# Patient Record
Sex: FEMALE | Race: WHITE | NOT HISPANIC OR LATINO | Employment: OTHER | URBAN - METROPOLITAN AREA
[De-identification: names, ages, dates, MRNs, and addresses within clinical notes are randomized per-mention and may not be internally consistent; named-entity substitution may affect disease eponyms.]

---

## 2017-10-04 ENCOUNTER — ALLSCRIPTS OFFICE VISIT (OUTPATIENT)
Dept: OTHER | Facility: OTHER | Age: 57
End: 2017-10-04

## 2017-10-05 NOTE — PROGRESS NOTES
Assessment  1  History of Hand Incision Tendon Sheath Of A Finger   2  Piriformis syndrome of right side (355 0) (G57 01)   3  Lumbago (724 2) (M54 5)    Plan  Lumbago, Piriformis syndrome of right side    · Cyclobenzaprine HCl - 5 MG Oral Tablet; TAKE 1 TABLET AT BEDTIME AS NEEDED   · PredniSONE 20 MG Oral Tablet; TAKE 3 TABLETS DAILY WITH FOOD    Discussion/Summary  Discussion Summary:   Patient is a 60-year-old femalePiriformis syndrome/lumbago - patient's symptoms appear likely secondary to moderate to severe lumbar spasm/sciatica  She was educated on the pathophysiology of this problem  Hold off on checking x-ray imaging  Start Rice treatment  Elevate legs at nighttime  May apply a warm compress as well as her TENS unit  Start treatment with 60 mg daily for the next 5 days  She is also given a prescription for Flexeril to take only at nighttime  If any of her symptoms are worsening, she was given ER precautions  Follow-up in 3 weeks if persisting  Chief Complaint  Chief Complaint Free Text Note Form: Pt present with back spasms since thrusday unsure how it happen, hard to walk, and stand  Dep screening is negative  History of Present Illness  Back Pain Lumbar, Acute (Brief): The patient is being seen for an initial evaluation of this episode of acute low back pain  Symptoms: not intermittent and unrelenting   The patient presents with complaints of sudden onset of constant episodes of bilateral lower back back pain, described as aching and throbbing, non-radiating  On a scale of 1 to 10, the patient rates the pain as 5  Episodes started 6 days ago  Symptoms are not improved by ice, heat, recumbency and NSAIDs  Symptoms are made worse by standing, sitting, prolonged sitting, lifting and bending  Associated symptoms: no insomnia,-no malaise,-no leg weakness,-no leg numbness,-no saddle paresthesia,-no fecal incontinence,-no urinary incontinence,-no urinary frequency,-no hematuria-and-no dysuria  Review of Systems  Complete-Female:   Constitutional: not feeling poorly-and-not feeling tired  Eyes: no eyesight problems-and-no purulent discharge from the eyes  ENT: no sore throat-and-no nasal discharge  Cardiovascular: no chest pain-and-no palpitations  Respiratory: no cough-and-no shortness of breath during exertion  Gastrointestinal: no nausea-and-no diarrhea  Genitourinary: no dysuria-and-no pelvic pain  Musculoskeletal: arthralgias, but-no myalgias  Integumentary: no rashes-and-no skin lesions  Neurological: difficulty walking, but-no numbness,-no dizziness-and-no limb weakness  Psychiatric: no anxiety-and-no depression  Endocrine: no muscle weakness-and-no deepening of the voice  Hematologic/Lymphatic: no tendency for easy bleeding-and-no tendency for easy bruising  Past Medical History  Active Problems And Past Medical History Reviewed: The active problems and past medical history were reviewed and updated today  Surgical History  1  History of Hand Incision Tendon Sheath Of A Finger  Surgical History Reviewed: The surgical history was reviewed and updated today  Family History  Mother    1  Family history of diabetes mellitus (V18 0) (Z83 3)   2  Family history of fibromyalgia (V17 89) (Z82 69)  Father    3  Family history of    4  Family history of cardiac disorder (V17 49) (Z82 49)   5  Family history of heart failure (V17 49) (Z82 49)  Brother    6  Family history of diabetes mellitus (V18 0) (Z83 3)  Family History Reviewed: The family history was reviewed and updated today  Social History   · Always uses seat belt   · Feels safe at home   · Former smoker (L20 76) (X34 433)   · Has 2 children   ·    · No guns in the home   · No illicit drug use   · Occasional alcohol use  Social History Reviewed: The social history was reviewed and updated today  The social history was reviewed and is unchanged  Allergies  1   Ibuprofen CAPS 2  Ibuprofen TABS   3  Naproxen Sodium TABS   4  Naproxen TABS  5  Seasonal    Vitals  Vital Signs    Recorded: 31XMD8497 10:03AM   Temperature 98 1 F, Tympanic   Heart Rate 73   Respiration 18   Systolic 025   Diastolic 88   Height 5 ft 4 in   Weight 234 lb 5 oz   BMI Calculated 40 22   BSA Calculated 2 09   O2 Saturation 97   Pain Scale 9     Physical Exam    Constitutional   General appearance: No acute distress, well appearing and well nourished  Eyes   Conjunctiva and lids: No swelling, erythema or discharge  Pupils and irises: Equal, round and reactive to light  Ears, Nose, Mouth, and Throat   External inspection of ears and nose: Normal     Otoscopic examination: Tympanic membranes translucent with normal light reflex  Canals patent without erythema  Nasal mucosa, septum, and turbinates: Normal without edema or erythema  Oropharynx: Normal with no erythema, edema, exudate or lesions  Pulmonary   Respiratory effort: No increased work of breathing or signs of respiratory distress  Auscultation of lungs: Clear to auscultation  Cardiovascular   Auscultation of heart: Normal rate and rhythm, normal S1 and S2, without murmurs  Examination of extremities for edema and/or varicosities: Normal     Abdomen   Abdomen: Non-tender, no masses  Liver and spleen: No hepatomegaly or splenomegaly  Lymphatic   Palpation of lymph nodes in neck: No lymphadenopathy  Musculoskeletal   Gait and station: Normal     Inspection/palpation of joints, bones, and muscles: Normal     Skin   Skin and subcutaneous tissue: Normal without rashes or lesions  Neurologic   Cranial nerves: Cranial nerves 2-12 intact  Sensation: No sensory loss      Psychiatric   Orientation to person, place, and time: Normal     Mood and affect: Normal          Results/Data  PHQ-9 Adult Depression Screening 36YMZ8863 10:17AM User, Ahs     Test Name Result Flag Reference   PHQ-9 Adult Depression Score 0     Over the last two weeks, how often have you been bothered by any of the following problems? Little interest or pleasure in doing things: Not at all - 0  Feeling down, depressed, or hopeless: Not at all - 0  Trouble falling or staying asleep, or sleeping too much: Not at all - 0  Feeling tired or having little energy: Not at all - 0  Poor appetite or over eating: Not at all - 0  Feeling bad about yourself - or that you are a failure or have let yourself or your family down: Not at all - 0  Trouble concentrating on things, such as reading the newspaper or watching television: Not at all - 0  Moving or speaking so slowly that other people could have noticed  Or the opposite -  being so fidgety or restless that you have been moving around a lot more than usual: Not at all - 0  Thoughts that you would be better off dead, or of hurting yourself in some way: Not at all - 0   PHQ-9 Adult Depression Screening Negative     PHQ-9 Difficulty Level Not difficult at all     PHQ-9 Severity No Depression         Signatures   Electronically signed by :  Sawyer Saleh DO; Oct  4 2017 11:25AM EST                       (Author)

## 2017-11-10 ENCOUNTER — ALLSCRIPTS OFFICE VISIT (OUTPATIENT)
Dept: OTHER | Facility: OTHER | Age: 57
End: 2017-11-10

## 2017-11-10 DIAGNOSIS — Z12.31 ENCOUNTER FOR SCREENING MAMMOGRAM FOR MALIGNANT NEOPLASM OF BREAST: ICD-10-CM

## 2017-11-11 NOTE — PROGRESS NOTES
Assessment    1  Excessive cerumen in left ear canal (380 4) (H61 22)   2  Acute atopic conjunctivitis of both eyes (372 05) (H10 13)    Plan  Acute atopic conjunctivitis of both eyes    · Azelastine HCl - 0 05 % Ophthalmic Solution; INSTILL 1 DROP IN EACH EYETWICE DAILY AS NEEDED  Colon cancer screening    · COLONOSCOPY; Status:Active - Retrospective By Protocol Authorization; Requestedfor:10Nov2017;    · *1 -  [facility] Co-Management  *  Status: Active Requested for: 60ZAZ1209  Care Summary provided  : Yes  Encounter for screening mammogram for malignant neoplasm of breast    · * MAMMO SCREENING BILATERAL W CAD; Status:Hold For - Scheduling,RetrospectiveBy Protocol Authorization; Requested for:10Nov2017;   Excessive cerumen in left ear canal    · Removal Impacted Cerumen Using Irrigation / Lavage one or both ears - POC;Status:Complete;   Done: 40GDS1287 03:42PM  Flu vaccine need    · Stop: Fluzone Quadrivalent Intramuscular Suspension    Discussion/Summary    Discussed condition with patient  She reports subjective improvement after ear lavage of the left EAC  I recommend that she keep the ear canal dry and should her symptoms return, that is also possible that she can call went L&I has eustachian tube dysfunction which I then recommended that she try an over-the-counter steroid nasal spray and oral decongestant  Regarding her eye symptoms, I suspect she has intermittent atopic conjunctivitis which I provided her a prescription for Astelin eyedrops to use as needed  She is to follow up should symptoms continue to persist  I also gave her prescription for yearly screening mammogram    Possible side effects of new medications were reviewed with the patient/guardian today  The treatment plan was reviewed with the patient/guardian  The patient/guardian understands and agrees with the treatment plan      Chief Complaint  Pt c/o B/L ear fullness and decreased hearing x1 wk   Pt states that her L ear is completely 'plugged ' Pt also c/o B/L eyes watering  History of Present Illness  HPI: Pt  presents with 1 week history of B/L ear fullness, pressure, decreased hearing, blocked sensation  She denies any sinus/nasal congestion  She has also had some B/L intermittent eye watering that she does not have currently  She is not sure of it could be related to allergies  Review of Systems   Constitutional: No fever, no chills, feels well, no tiredness, no recent weight gain or loss  ENT: as noted in HPI  Cardiovascular: no complaints of slow or fast heart rate, no chest pain, no palpitations, no leg claudication or lower extremity edema  Respiratory: no complaints of shortness of breath, no wheezing, no dyspnea on exertion, no orthopnea or PND  Gastrointestinal: no complaints of abdominal pain, no constipation, no nausea or diarrhea, no vomiting, no bloody stools  Genitourinary: no complaints of dysuria, no incontinence, no pelvic pain, no dysmenorrhea, no vaginal discharge or abnormal vaginal bleeding  Other Symptoms: Intermittent eye watering-as noted in HPI  Active Problems  1  Lumbago (724 2) (M54 5)   2  Piriformis syndrome of right side (355 0) (G57 01)    Past Medical History  1  Denied: History of substance abuse   2  Denied: History of Mental health problem    Family History  Mother    1  Family history of diabetes mellitus (V18 0) (Z83 3)   2  Family history of fibromyalgia (V17 89) (Z82 69)  Father    3  Family history of    4  Family history of cardiac disorder (V17 49) (Z82 49)   5  Family history of heart failure (V17 49) (Z82 49)  Brother    6  Family history of diabetes mellitus (V18 0) (Z83 3)    Social History   · Always uses seat belt   · Feels safe at home   · Former smoker (Q83 35) (L83 067)   · Has 2 children   ·    · No guns in the home   · No illicit drug use   · Occasional alcohol use  The social history was reviewed and is unchanged  Surgical History  1  History of Hand Incision Tendon Sheath Of A Finger    Allergies  1  Ibuprofen CAPS   2  Ibuprofen TABS   3  Naproxen Sodium TABS   4  Naproxen TABS  5  Seasonal    Vitals   Recorded: 98IOI1465 03:07PM   Temperature 97 4 F, Tympanic   Heart Rate 97   Pulse Quality Normal   Respiration Quality Normal   Respiration 18   Systolic 377, RUE, Sitting   Diastolic 82, RUE, Sitting   BP CUFF SIZE Large   Height 5 ft 4 in   Weight 235 lb 1 oz   BMI Calculated 40 35   BSA Calculated 2 09   O2 Saturation 97   LMP postmenopausal       Physical Exam   Constitutional  General appearance: No acute distress, well appearing and well nourished  Eyes  Conjunctiva and lids: No swelling, erythema or discharge  Pupils and irises: Equal, round and reactive to light  Ears, Nose, Mouth, and Throat  External inspection of ears and nose: Normal    Otoscopic examination: Abnormal  -- Left EAC with cerumen that is abutting the TM so it is not clearly visible; right ear exam is unremarkable  Nasal mucosa, septum, and turbinates: Normal without edema or erythema  Oropharynx: Normal with no erythema, edema, exudate or lesions  Pulmonary  Respiratory effort: No increased work of breathing or signs of respiratory distress  Auscultation of lungs: Clear to auscultation  Cardiovascular  Auscultation of heart: Normal rate and rhythm, normal S1 and S2, without murmurs  Lymphatic  Palpation of lymph nodes in neck: No lymphadenopathy  Psychiatric  Orientation to person, place, and time: Normal    Mood and affect: Normal    Additional Exam:  Vital signs reviewed  Procedure           Procedure: cerumen removal   Indication: tympanic membrane(s) could not be visualized and cerumen impaction in the left ear  Prep: Debrox was placed in the canal prior to the procedure  Procedure Note: The procedure was performed by the Provider-- and-- lasted 10 minute(s)--   The procedure required significant time and effort to remove the cerumen    A otoscope was placed in the ear canal(s) to visualize the ear canal debris  The ear was cleaned by using warm water irrigation  The procedure was successful  Post-Procedure:  Patient Status: the patient tolerated the procedure well  Complications: there were no complications  Patient instructions: dry ear precautions-- and-- avoid using q-tips  Follow-up as needed  Signatures   Electronically signed by : oJseluis Sequeira, [facility]; Nov 10 2017  4:08PM EST                       (Author)    Electronically signed by :  Lindie Dancer, DO; Nov 10 2017  9:05PM EST                       (Author)

## 2018-01-12 VITALS
HEIGHT: 64 IN | BODY MASS INDEX: 40.13 KG/M2 | TEMPERATURE: 97.4 F | RESPIRATION RATE: 18 BRPM | WEIGHT: 235.06 LBS | DIASTOLIC BLOOD PRESSURE: 82 MMHG | SYSTOLIC BLOOD PRESSURE: 124 MMHG | HEART RATE: 97 BPM | OXYGEN SATURATION: 97 %

## 2018-01-13 VITALS
BODY MASS INDEX: 40 KG/M2 | SYSTOLIC BLOOD PRESSURE: 142 MMHG | RESPIRATION RATE: 18 BRPM | OXYGEN SATURATION: 97 % | HEIGHT: 64 IN | DIASTOLIC BLOOD PRESSURE: 88 MMHG | HEART RATE: 73 BPM | TEMPERATURE: 98.1 F | WEIGHT: 234.31 LBS

## 2018-01-15 ENCOUNTER — ALLSCRIPTS OFFICE VISIT (OUTPATIENT)
Dept: OTHER | Facility: OTHER | Age: 58
End: 2018-01-15

## 2018-01-15 DIAGNOSIS — K59.09 OTHER CONSTIPATION: ICD-10-CM

## 2018-01-16 NOTE — CONSULTS
Assessment   1  Colon cancer screening (V76 51) (Z12 11)   2  Chronic constipation (564 00) (K59 09)   3  Morbid obesity with BMI of 40 0-44 9, adult (278 01,V85 41) (E66 01,Z68 41)    Plan   Chronic constipation    · MoviPrep 100 GM Oral Solution Reconstituted; USE AS DIRECTED   Rx By: Louis Hammond; Dispense: 1 Days ; #:1 Solution Reconstituted; Refill: 0;For: Chronic constipation; OSEI = N; Verified Transmission to [address] [place] [address]; Last Updated By: SystemBringrs; 1/15/2018 9:01:49 AM   · Follow-up visit in 4 Months Evaluation and Treatment  [facility]  [facility]: [facility] For    - Scheduling  Requested for: [id number]   Ordered; For: Chronic constipation; Ordered By: Louis Hammond Performed:  Due: 51NBB4513   · (1) TSH; Status:Active; Requested GZC:[id number]; Perform:[facility]; LYW:[id number];ZLCSDBB; For:Chronic constipation; Ordered By:Natali Baker;  Colon cancer screening    · COLONOSCOPY (GI, SURG); Status:Hold For - Scheduling; Requested FPL:[id number]; Perform:[facility]; AQT:52RGL3053;ALYSSA; For:Colon cancer screening; Ordered By:Chaput, Karyle Malta; Morbid obesity with BMI of 40 0-44 9, adult    · 1 Armida Giles MD, Homero Hernandez (Internal Medicine) Co-Management  *  Status: Active  Requested    for: 65JJA8245   Ordered; For: Morbid obesity with BMI of 40 0-44 9, adult; Ordered By: Louis Hammond Performed:  Due: 76EWU0404  Care Summary provided  : Yes    Discussion/Summary   Discussion Summary:    63 year old female    colon cancer screening- she is agreeable to colonoscopy so we will arrange, risks and benefits discussed with pt   constipation- currently diet controlled, pt not interested in medication, will check TSH, recent CBC and CMP were wnl   obesity with BMI of 40- pt agreeable to medical weight loss, will refer  up in a few months time  Chief Complaint   Chief Complaint Free Text Note Form: pt consult for colon cancer screening   referred by Dr Luther Gaitan History of Present Illness   HPI: 65year old female here as an evaluation for colon cancer screening  reports long standing constipation  She has tried miralax in the past  has started a new diet and has lost weight recently but has put it back on     has never had a colonoscopy  Denies any family history of colon cancer  Review of Systems   Complete-Female GI Adult:      Constitutional: No fever, no chills, feels well, no tiredness, no recent weight gain or weight loss  Eyes: eyesight problems, but-- no eye pain,-- no dryness of the eyes,-- eyes not red,-- no purulent discharge from the eyes-- and-- no itching of the eyes  ENT: no complaints of earache, no loss of hearing, no nose bleeds, no nasal discharge, no sore throat, no hoarseness  Cardiovascular: No complaints of slow heart rate, no fast heart rate, no chest pain, no palpitations, no leg claudication, no lower extremity edema  Respiratory: No complaints of shortness of breath, no wheezing, no cough, no SOB on exertion, no orthopnea, no PND  Gastrointestinal: constipation, but-- no abdominal pain,-- no nausea,-- no vomiting,-- no diarrhea-- and-- no blood in stools  Genitourinary: No complaints of dysuria, no incontinence, no pelvic pain, no dysmenorrhea, no vaginal discharge or bleeding  Musculoskeletal: No complaints of arthralgias, no myalgias, no joint swelling or stiffness, no limb pain or swelling  Integumentary: No complaints of skin rash or lesions, no itching, no skin wounds, no breast pain or lump  Neurological: No complaints of headache, no confusion, no convulsions, no numbness, no dizziness or fainting, no tingling, no limb weakness, no difficulty walking  Psychiatric: Not suicidal, no sleep disturbance, no anxiety or depression, no change in personality, no emotional problems        Endocrine: No complaints of proptosis, no hot flashes, no muscle weakness, no deepening of the voice, no feelings of weakness  Hematologic/Lymphatic: No complaints of swollen glands, no swollen glands in the neck, does not bleed easily, does not bruise easily  ROS Reviewed:    ROS reviewed  Active Problems   1  Acute atopic conjunctivitis of both eyes (372 05) (H10 13)   2  Colon cancer screening (V76 51) (Z12 11)   3  Excessive cerumen in left ear canal (380 4) (H61 22)   4  Flu vaccine need (V04 81) (Z23)   5  Lumbago (724 2) (M54 5)   6  Piriformis syndrome of right side (355 0) (G57 01)    Past Medical History   1  Denied: History of substance abuse   2  Denied: History of Mental health problem  Active Problems And Past Medical History Reviewed: The active problems and past medical history were reviewed and updated today  Surgical History   1  History of Hand Incision Tendon Sheath Of A Finger  Surgical History Reviewed: The surgical history was reviewed and updated today  Family History   Mother    1  Family history of diabetes mellitus (V18 0) (Z83 3)   2  Family history of fibromyalgia (V17 89) (Z82 69)   3  Denied: Family history of substance abuse   4  Denied: Family history of Mental health problem  Father    5  Family history of    6  Family history of cardiac disorder (V17 49) (Z82 49)   7  Family history of heart failure (V17 49) (Z82 49)   8  Denied: Family history of substance abuse   9  Denied: Family history of Mental health problem  Brother    8  Family history of diabetes mellitus (V18 0) (Z83 3)  Family History Reviewed: The family history was reviewed and updated today  Social History    · Always uses seat belt   · Feels safe at home   · Former smoker (K78 33) (X85 477)   · Has 2 children   ·    · No guns in the home   · No illicit drug use   · Occasional alcohol use  Social History Reviewed: The social history was reviewed and updated today  Current Meds    1  No Reported Medications Recorded  Medication List Reviewed:     The medication list was reviewed and updated today  Allergies   1  Ibuprofen CAPS   2  Ibuprofen TABS   3  Naproxen Sodium TABS   4  Naproxen TABS  5  Seasonal    Vitals   Vital Signs    Recorded: 17OJP7733 08:46AM   Temperature 98 F, Tympanic   Heart Rate 76   Systolic 873, RUE, Sitting   Diastolic 80, RUE, Sitting   Height 5 ft 4 in   Weight 236 lb 2 0 oz   BMI Calculated 40 53   BSA Calculated 2 1   O2 Saturation 97, RA     Physical Exam        Constitutional      General appearance: No acute distress, well appearing and well nourished  Eyes      Conjunctiva and lids: No swelling, erythema or discharge  Pupils and irises: Equal, round and reactive to light  Ears, Nose, Mouth, and Throat      External inspection of ears and nose: Normal        Nasal mucosa, septum, and turbinates: Normal without edema or erythema  Oropharynx: Normal with no erythema, edema, exudate or lesions  Pulmonary      Respiratory effort: No increased work of breathing or signs of respiratory distress  Auscultation of lungs: Clear to auscultation  Cardiovascular      Auscultation of heart: Normal rate and rhythm, normal S1 and S2, without murmurs  Examination of extremities for edema and/or varicosities: Normal        Abdomen      Abdomen: Non-tender, no masses  Liver and spleen: No hepatomegaly or splenomegaly  Lymphatic      Palpation of lymph nodes in neck: No lymphadenopathy  Musculoskeletal      Gait and station: Normal        Digits and nails: Normal without clubbing or cyanosis  Inspection/palpation of joints, bones, and muscles: Normal        Skin      Skin and subcutaneous tissue: Normal without rashes or lesions         Psychiatric      Orientation to person, place, and time: Normal        Mood and affect: Normal           Signatures    Electronically signed by : Kavita Shaw DO; Lencho 15 2018  9:07AM EST                       (Author)

## 2018-01-22 VITALS
WEIGHT: 236.13 LBS | OXYGEN SATURATION: 97 % | TEMPERATURE: 98 F | DIASTOLIC BLOOD PRESSURE: 80 MMHG | BODY MASS INDEX: 40.31 KG/M2 | HEIGHT: 64 IN | HEART RATE: 76 BPM | SYSTOLIC BLOOD PRESSURE: 110 MMHG

## 2018-01-30 PROBLEM — Z12.11 SPECIAL SCREENING FOR MALIGNANT NEOPLASMS, COLON: Status: ACTIVE | Noted: 2018-01-30

## 2018-02-20 ENCOUNTER — OFFICE VISIT (OUTPATIENT)
Dept: FAMILY MEDICINE CLINIC | Facility: CLINIC | Age: 58
End: 2018-02-20
Payer: COMMERCIAL

## 2018-02-20 VITALS
TEMPERATURE: 97.4 F | HEART RATE: 70 BPM | HEIGHT: 63 IN | OXYGEN SATURATION: 99 % | WEIGHT: 235.9 LBS | BODY MASS INDEX: 41.8 KG/M2 | SYSTOLIC BLOOD PRESSURE: 126 MMHG | DIASTOLIC BLOOD PRESSURE: 82 MMHG

## 2018-02-20 DIAGNOSIS — M25.561 ACUTE PAIN OF RIGHT KNEE: Primary | ICD-10-CM

## 2018-02-20 PROCEDURE — 99213 OFFICE O/P EST LOW 20 MIN: CPT | Performed by: FAMILY MEDICINE

## 2018-02-20 RX ORDER — MELOXICAM 7.5 MG/1
7.5 TABLET ORAL
COMMUNITY
Start: 2017-10-10 | End: 2018-10-10

## 2018-02-20 RX ORDER — CYCLOBENZAPRINE HCL 5 MG
5 TABLET ORAL
Status: ON HOLD | COMMUNITY
End: 2018-03-08

## 2018-02-20 RX ORDER — KETOROLAC TROMETHAMINE 10 MG/1
10 TABLET, FILM COATED ORAL EVERY 6 HOURS
Status: ON HOLD | COMMUNITY
Start: 2017-10-10 | End: 2018-03-08

## 2018-02-20 RX ORDER — OXYCODONE HYDROCHLORIDE 5 MG/1
5 TABLET ORAL EVERY 4 HOURS
Status: ON HOLD | COMMUNITY
Start: 2017-10-10 | End: 2018-03-08

## 2018-02-20 NOTE — PROGRESS NOTES
Assessment/Plan:   1  Acute pain of right knee   symptoms appear likely secondary to intra-articular pathology such as away/meniscus tear  At this time, check x-ray to rule out gross abnormalities  Start rice treatment  Ice  Need multiple times per day  Elevated at nighttime  May continue with her Mobic  She is given a referral for physical therapy  If her symptoms are improving in 3-4 weeks, she was advised to follow up  - XR knee 4+ vw right injury; Future  - Ambulatory referral to Physical Therapy; Future     Diagnoses and all orders for this visit:    Acute pain of right knee  -     XR knee 4+ vw right injury; Future  -     Ambulatory referral to Physical Therapy; Future          Subjective:  Chief Complaint   Patient presents with    Leg Pain     L knee x 3 months, getting worse with sciatic pain        Patient ID: Shelly Deutsch is a 65 y o  female  Knee Pain    Incident onset: 1 month  There was no injury mechanism  The pain is present in the left knee  The quality of the pain is described as aching  The pain is at a severity of 6/10  The pain is mild  The pain has been intermittent since onset  Pertinent negatives include no inability to bear weight, loss of motion or numbness  The symptoms are aggravated by movement (position changes)  She has tried NSAIDs for the symptoms  Review of Systems   Constitutional: Negative for activity change, chills, fatigue and fever  HENT: Negative for congestion, ear pain, sinus pressure and sore throat  Eyes: Negative for redness, itching and visual disturbance  Respiratory: Negative for cough and shortness of breath  Cardiovascular: Negative for chest pain and palpitations  Gastrointestinal: Negative for abdominal pain, diarrhea and nausea  Endocrine: Negative for cold intolerance and heat intolerance  Genitourinary: Negative for dysuria, flank pain and frequency     Musculoskeletal: Negative for arthralgias, back pain, gait problem and myalgias  Skin: Negative for color change  Allergic/Immunologic: Negative for environmental allergies  Neurological: Negative for dizziness, numbness and headaches  Psychiatric/Behavioral: Negative for behavioral problems and sleep disturbance  Objective:  Vitals:    02/20/18 1409   BP: 126/82   BP Location: Left arm   Patient Position: Sitting   Pulse: 70   Temp: (!) 97 4 °F (36 3 °C)   TempSrc: Tympanic   SpO2: 99%   Weight: 107 kg (235 lb 14 4 oz)   Height: 5' 2 5" (1 588 m)        Physical Exam   Constitutional: She appears well-developed and well-nourished  No distress  Cardiovascular: Normal rate  Pulmonary/Chest: Effort normal    Musculoskeletal:        Left knee: She exhibits normal range of motion, no swelling, no effusion and no bony tenderness  Tenderness found  Patellar tendon tenderness noted  No medial joint line tenderness noted       Negative anterior/posterior drawer test, positive Orestes, negative Lachman negative  Varus strain

## 2018-02-21 ENCOUNTER — TRANSCRIBE ORDERS (OUTPATIENT)
Dept: FAMILY MEDICINE CLINIC | Facility: CLINIC | Age: 58
End: 2018-02-21

## 2018-02-21 ENCOUNTER — APPOINTMENT (OUTPATIENT)
Dept: RADIOLOGY | Facility: MEDICAL CENTER | Age: 58
End: 2018-02-21
Payer: COMMERCIAL

## 2018-02-21 DIAGNOSIS — M25.562 ACUTE PAIN OF LEFT KNEE: Primary | ICD-10-CM

## 2018-02-21 DIAGNOSIS — M25.561 ACUTE PAIN OF RIGHT KNEE: ICD-10-CM

## 2018-02-21 PROCEDURE — 73564 X-RAY EXAM KNEE 4 OR MORE: CPT

## 2018-02-22 ENCOUNTER — EVALUATION (OUTPATIENT)
Dept: PHYSICAL THERAPY | Facility: CLINIC | Age: 58
End: 2018-02-22
Payer: COMMERCIAL

## 2018-02-22 DIAGNOSIS — M25.562 ACUTE PAIN OF LEFT KNEE: Primary | ICD-10-CM

## 2018-02-22 PROCEDURE — G8981 BODY POS CURRENT STATUS: HCPCS

## 2018-02-22 PROCEDURE — 97161 PT EVAL LOW COMPLEX 20 MIN: CPT

## 2018-02-22 PROCEDURE — G8982 BODY POS GOAL STATUS: HCPCS

## 2018-02-22 NOTE — PROGRESS NOTES
PT Evaluation     Today's date: 2018  Patient name: Noel Ocampo  : 1960  MRN: [id number]  Referring provider: Michael Best DO  Dx:   Encounter Diagnosis     ICD-10-CM    1  Acute pain of left knee M25 562 Ambulatory referral to Physical Therapy                  Assessment  Impairments: abnormal or restricted ROM, activity intolerance, impaired physical strength, pain with function and weight-bearing intolerance  Functional limitations: difficulty ambulating farther than 300 feet, difficulty ascending/descending stairs, difficulty completing car transfers  Assessment details: Noel Ocampo is a 65 y o  female presenting to PT with pain, decreased knee range of motion, decreased LE strength, balance deficits, and decreased tolerance to activity consistent with possible meniscal pathology  Patient would benefit from skilled PT services to address these impairments and to maximize function in order to improve quality of life  Thank you for the referral   Understanding of Dx/Px/POC: good   Prognosis: good    Goals  STG  1) L knee ROM will improve 50% in 3 weeks  2) L knee strength will improve 1/2 grade in 3 weeks  3) B/L hip strength will improve 1/2 grade in 3 weeks  LTG  1) Patient is independent in HEP  2) Patient will ascend/descend one flight of stairs independently with no increased pain in 6 weeks  3) Ambulation will be improved to PLOF in 6 weeks  4) Car transfers will improve to PLOF in 6 weeks      Plan  Patient would benefit from: skilled PT  Planned modality interventions: cryotherapy and thermotherapy: hydrocollator packs  Planned therapy interventions: balance/weight bearing training, home exercise program, therapeutic exercise, therapeutic activities, stretching, strengthening, patient education, neuromuscular re-education, manual therapy and joint mobilization  Frequency: 2x week  Duration in weeks: 6  Treatment plan discussed with: patient        Subjective Evaluation    History of Present Illness  Mechanism of injury: Patient reports that in 2017 she had back pain with LLE radiculopathy  After that resolved, she continued to have L knee pain which she describes as feeling much different than her sciatic symptoms  She does not recall any traumatic event, however notes that her pain is most painful when transferring from car and descending stairs  Quality of life: good    Pain  Current pain ratin  At best pain ratin  At worst pain ratin  Quality: discomfort and dull ache  Relieving factors: heat  Aggravating factors: sitting  Progression: no change    Patient Goals  Patient goals for therapy: decreased pain, increased strength, independence with ADLs/IADLs, improved balance and return to sport/leisure activities          Objective     Tenderness   Left Knee   Tenderness in the lateral joint line and medial joint line  Active Range of Motion   Left Knee   Flexion: 120 degrees   Extension: -12 degrees     Right Knee   Flexion: 130 degrees   Extension: -5 degrees     Passive Range of Motion   Left Knee   Flexion: 124 degrees   Extension: -8 degrees     Mobility   Patellar Mobility:   Left Knee   Hypomobile: left medial, left lateral, left superior and left inferior    Strength/Myotome Testing     Left Knee   Flexion: 4-  Extension: 4  Quadriceps contraction: good    Additional Strength Details  Hip flexion and abduction strength 4/5 B/L    Tests     Left Knee   Positive lateral Orestes and patellar compression  Negative anterior drawer, valgus stress test at 30 degrees and varus stress test at 30 degrees  Right Knee   Negative lateral Orestes and patellar compression       General Comments     Knee Comments  FOTO score: 35        Precautions: none    Daily Treatment Diary     Manual              Patellar mobs             PA Tib-fem mobs with TKE                                                        Exercise Diary              Heel slides             SAQ Calf stretch             HS stretch             Glute bridge             SLR flex             SLR abduction             Iso hip abd/add             Clam shells             LAQ             Rocker board balance             Rocker board shifts             Standing abduction/ext             Mini squats                                                                                  Modalities              MHP/CP prn

## 2018-02-27 ENCOUNTER — OFFICE VISIT (OUTPATIENT)
Dept: PHYSICAL THERAPY | Facility: CLINIC | Age: 58
End: 2018-02-27
Payer: COMMERCIAL

## 2018-02-27 DIAGNOSIS — M25.562 ACUTE PAIN OF LEFT KNEE: Primary | ICD-10-CM

## 2018-02-27 PROCEDURE — 97112 NEUROMUSCULAR REEDUCATION: CPT

## 2018-02-27 PROCEDURE — 97110 THERAPEUTIC EXERCISES: CPT

## 2018-02-27 PROCEDURE — 97140 MANUAL THERAPY 1/> REGIONS: CPT

## 2018-02-27 NOTE — PROGRESS NOTES
Daily Note     Today's date: 2018  Patient name: Erlin Davison  : 1960  MRN: [id number]  Referring provider: Jerrod Da Silva DO  Dx:   Encounter Diagnosis     ICD-10-CM    1  Acute pain of left knee M25 562                   Subjective: Patient reports her L knee has been feeling better overall since IE  She does note that this morning when she awoke she had more soreness and thinks she may have slept wrong  Objective: See treatment diary below  L knee flexion AROM: 128 degrees      Assessment: Tolerated treatment well  Patient with reports of increased muscle fatigue and weakness with strengthening exercisess  Patient demonstrated fatigue post treatment, exhibited good technique with therapeutic exercises and would benefit from continued PT  Plan: Continue per plan of care  Progress treatment as tolerated          Precautions: none     Daily Treatment Diary      Manual                        Patellar mobs  AN                     PA Tib-fem mobs with TKE  AN                        10'                                                                           Exercise Diary                        Heel slides D/C HEP                     SAQ 3" x10                     Calf stretch 30" x3                     HS stretch 30" x3                     Glute bridge 2x10                     SLR flex 2x10                     SLR abduction 2x10                     Iso hip abd/add 3" x 20 ea                     Clam shells NV                     LAQ NV                     Rocker board balance NP                     Rocker board shifts AP/ML 20 ea                     Standing abduction/ext --/2x10                     Mini squats 2x10                     Standing HS curls 2# 2x10                     Foam balance DLS, EO 30" x2                                                                                                   Modalities                        MHP/CP prn

## 2018-03-06 ENCOUNTER — OFFICE VISIT (OUTPATIENT)
Dept: PHYSICAL THERAPY | Facility: CLINIC | Age: 58
End: 2018-03-06
Payer: COMMERCIAL

## 2018-03-06 DIAGNOSIS — M25.562 ACUTE PAIN OF LEFT KNEE: Primary | ICD-10-CM

## 2018-03-06 PROCEDURE — 97112 NEUROMUSCULAR REEDUCATION: CPT

## 2018-03-06 PROCEDURE — 97110 THERAPEUTIC EXERCISES: CPT

## 2018-03-06 NOTE — PROGRESS NOTES
Daily Note     Today's date: 3/6/2018  Patient name: Mina Cowan  : 1960  MRN: [id number]  Referring provider: Steven Lawler DO  Dx:   Encounter Diagnosis     ICD-10-CM    1  Acute pain of left knee M25 562                   Subjective: Patient reports no change in symptoms since previous session  She states this morning her knee feels stiff as she has not done much to loosen it up as of yet  Objective: See treatment diary below  Assessment: Tolerated treatment well  No reports of increased pain or discomfort throughout session, except for some clicking noted during mini squats which caused discomfort  Patient exhibited good technique with therapeutic exercises and would benefit from continued PT  Plan: Continue per plan of care  Progress treatment as tolerated          Precautions: none     Daily Treatment Diary      Manual   2/27  3/6                   Patellar mobs  AN  NP                   PA Tib-fem mobs with TKE  AN  NP                      10'                                                                           Exercise Diary   2/27 3/6                   Heel slides D/C HEP                    SAQ 3" x10 3" x20                   Calf stretch 30" x3 30" x3                   HS stretch 30" x3 30" x3                   Glute bridge 2x10 2x10                   SLR flex 2x10 2x10                   SLR abduction 2x10 2x10                   Iso hip abd/add 3" x 20 ea 3" x30 ea                   Clam shells NV 1x15                   LAQ NV 2# x10                   Rocker board balance NP AP/ML 1' ea                   Rocker board shifts AP/ML 20 ea AP/ML 30 ea                   Standing abduction/ext --/2x10 2x10 ea                   Mini squats 2x10 1x10                   Standing HS curls 2# 2x10 2# 3x10                   Foam balance DLS, EO 30" x2 NP                   HR/TR   30 ea                   CC TKE   red 5"x30                                                 Modalities                        MHP/CP prn

## 2018-03-07 ENCOUNTER — ANESTHESIA EVENT (OUTPATIENT)
Dept: GASTROENTEROLOGY | Facility: HOSPITAL | Age: 58
End: 2018-03-07
Payer: COMMERCIAL

## 2018-03-08 ENCOUNTER — HOSPITAL ENCOUNTER (OUTPATIENT)
Facility: HOSPITAL | Age: 58
Setting detail: OUTPATIENT SURGERY
Discharge: HOME | End: 2018-03-08
Attending: INTERNAL MEDICINE | Admitting: INTERNAL MEDICINE
Payer: COMMERCIAL

## 2018-03-08 ENCOUNTER — ANESTHESIA (OUTPATIENT)
Dept: GASTROENTEROLOGY | Facility: HOSPITAL | Age: 58
End: 2018-03-08
Payer: COMMERCIAL

## 2018-03-08 ENCOUNTER — APPOINTMENT (OUTPATIENT)
Dept: PHYSICAL THERAPY | Facility: CLINIC | Age: 58
End: 2018-03-08
Payer: COMMERCIAL

## 2018-03-08 VITALS
BODY MASS INDEX: 39.51 KG/M2 | DIASTOLIC BLOOD PRESSURE: 61 MMHG | TEMPERATURE: 97.8 F | OXYGEN SATURATION: 98 % | RESPIRATION RATE: 22 BRPM | WEIGHT: 223 LBS | HEIGHT: 63 IN | SYSTOLIC BLOOD PRESSURE: 121 MMHG | HEART RATE: 69 BPM

## 2018-03-08 DIAGNOSIS — Z12.11 SPECIAL SCREENING FOR MALIGNANT NEOPLASMS, COLON: ICD-10-CM

## 2018-03-08 PROCEDURE — 88305 TISSUE EXAM BY PATHOLOGIST: CPT | Performed by: PATHOLOGY

## 2018-03-08 PROCEDURE — 45381 COLONOSCOPY SUBMUCOUS NJX: CPT | Performed by: INTERNAL MEDICINE

## 2018-03-08 PROCEDURE — 45380 COLONOSCOPY AND BIOPSY: CPT | Performed by: INTERNAL MEDICINE

## 2018-03-08 PROCEDURE — 45385 COLONOSCOPY W/LESION REMOVAL: CPT | Performed by: INTERNAL MEDICINE

## 2018-03-08 RX ORDER — SODIUM CHLORIDE 9 MG/ML
125 INJECTION, SOLUTION INTRAVENOUS CONTINUOUS
Status: DISCONTINUED | OUTPATIENT
Start: 2018-03-08 | End: 2018-03-08 | Stop reason: HOSPADM

## 2018-03-08 RX ORDER — PROPOFOL 10 MG/ML
INJECTION, EMULSION INTRAVENOUS AS NEEDED
Status: DISCONTINUED | OUTPATIENT
Start: 2018-03-08 | End: 2018-03-08 | Stop reason: SURG

## 2018-03-08 RX ADMIN — PROPOFOL 50 MG: 10 INJECTION, EMULSION INTRAVENOUS at 11:27

## 2018-03-08 RX ADMIN — PROPOFOL 50 MG: 10 INJECTION, EMULSION INTRAVENOUS at 11:41

## 2018-03-08 RX ADMIN — PROPOFOL 50 MG: 10 INJECTION, EMULSION INTRAVENOUS at 11:00

## 2018-03-08 RX ADMIN — PROPOFOL 50 MG: 10 INJECTION, EMULSION INTRAVENOUS at 11:12

## 2018-03-08 RX ADMIN — PROPOFOL 50 MG: 10 INJECTION, EMULSION INTRAVENOUS at 11:02

## 2018-03-08 RX ADMIN — PROPOFOL 100 MG: 10 INJECTION, EMULSION INTRAVENOUS at 11:24

## 2018-03-08 RX ADMIN — SODIUM CHLORIDE 125 ML/HR: 0.9 INJECTION, SOLUTION INTRAVENOUS at 09:26

## 2018-03-08 RX ADMIN — LIDOCAINE HYDROCHLORIDE 60 MG: 20 INJECTION, SOLUTION INTRAVENOUS at 10:54

## 2018-03-08 RX ADMIN — SODIUM CHLORIDE 125 ML/HR: 0.9 INJECTION, SOLUTION INTRAVENOUS at 11:40

## 2018-03-08 RX ADMIN — PROPOFOL 50 MG: 10 INJECTION, EMULSION INTRAVENOUS at 11:35

## 2018-03-08 RX ADMIN — PROPOFOL 50 MG: 10 INJECTION, EMULSION INTRAVENOUS at 11:08

## 2018-03-08 RX ADMIN — PROPOFOL 100 MG: 10 INJECTION, EMULSION INTRAVENOUS at 11:19

## 2018-03-08 RX ADMIN — PROPOFOL 200 MG: 10 INJECTION, EMULSION INTRAVENOUS at 10:56

## 2018-03-08 RX ADMIN — PROPOFOL 50 MG: 10 INJECTION, EMULSION INTRAVENOUS at 11:04

## 2018-03-08 NOTE — DISCHARGE INSTRUCTIONS
OPERATIVE REPORT  PATIENT NAME: Adelfo Tirado    :  1960  MRN: [id number]  Pt Location: [facility]     SURGERY DATE: 3/8/2018     Surgeon(s) and Role:     Courtney Cordero DO - Primary     * Martínez Bertrand - Assisting     Preop Diagnosis:  Special screening for malignant neoplasms, colon [Z12 11]     Post-Op Diagnosis Codes: * Special screening for malignant neoplasms, colon [Z12 11]     Procedure(s) (LRB):  COLONOSCOPY with polypectomy and tattoo (N/A)     Specimen(s):  ID Type Source Tests Collected by Time Destination   1 : transverse colon polyp Tissue Large Intestine, Transverse Colon TISSUE EXAM Natalijosephine Baker, DO 3/8/2018 1124     2 : Sigmoid polyp at 30cm Tissue Large Intestine, Sigmoid Colon TISSUE EXAM Natali Baker, DO 3/8/2018 1133     3 : Rectal polyps x3 Tissue Colon TISSUE EXAM Donya Marsh, DO 3/8/2018 1137           Estimated Blood Loss:   Minimal     Drains:        Anesthesia Type:   Choice     Operative Indications:  Special screening for malignant neoplasms, colon [Z12 11]        Operative Findings:     Colonoscopy Procedure Note     Procedure: Colonoscopy     Sedation: Monitored anesthesia care, check anesthesia records      ASA Class: 3     INDICATIONS: Screening for Colon Cancer     POST-OP DIAGNOSIS: See the impression below     Procedure Details      Prior colonoscopy: No prior colonoscopy      Informed consent was obtained for the procedure, including sedation  Risks of perforation, hemorrhage, adverse drug reaction and aspiration were discussed  The patient was placed in the left lateral decubitus position  Based on the pre-procedure assessment, including review of the patient's medical history, medications, allergies, and review of systems, she had been deemed to be an appropriate candidate for conscious sedation; she was therefore sedated with the medications listed below     The patient was monitored continuously with telemetry, pulse oximetry, blood pressure monitoring, and direct observations  A rectal examination was performed  The colonoscope was inserted into the rectum and advanced under direct vision to the cecum, which was identified by the ileocecal valve and appendiceal orifice  The quality of the colonic preparation was good  A careful inspection was made as the colonoscope was withdrawn, including a retroflexed view of the rectum; findings and interventions are described below      Findings:  2 cm sessile polyp in transverse colon  Removed in piecemeal fashion  Site tattoed  3 cm pedunculated polyp in the sigmoid colon  Removed with hot snare polypectomy  Site tattoed  Three subcentimeter polyps removed from the rectum  Removed with cold snare polypectomy  Complications:  None; patient tolerated the procedure well      Impression:    Multiple polyps removed  Two large polyps removed and site tattooed      Recommendations:  Repeat colonoscopy in 6 months due to polypectomy in piecemeal fashion  Await pathology results           SIGNATURE: Luis Antonio Valentin DO  DATE: March 8, 2018  TIME: 11:43 AM              Colonoscopy   WHAT YOU NEED TO KNOW:   A colonoscopy is a procedure to examine the inside of your colon (intestine) with a scope  Polyps or tissue growths may have been removed during your colonoscopy  It is normal to feel bloated and to have some abdominal discomfort  You should be passing gas  If you have hemorrhoids or you had polyps removed, you may have a small amount of bleeding  DISCHARGE INSTRUCTIONS:   Seek care immediately if:   · You have a large amount of bright red blood in your bowel movements  · Your abdomen is hard and firm and you have severe pain  · You have sudden trouble breathing  Contact your healthcare provider if:   · You develop a rash or hives  · You have a fever within 24 hours of your procedure  · You have not had a bowel movement for 3 days after your procedure      · You have questions or concerns about your condition or care  Activity:   · Do not lift, strain, or run  for 3 days after your procedure  · Rest after your procedure  You have been given medicine to relax you  Do not  drive or make important decisions until the day after your procedure  Return to your normal activity as directed  · Relieve gas and discomfort from bloating  by lying on your right side with a heating pad on your abdomen  You may need to take short walks to help the gas move out  Eat small meals until bloating is relieved  If you had polyps removed: For 7 days after your procedure:  · Do not  take aspirin  · Do not  go on long car rides  Help prevent constipation:   · Eat a variety of healthy foods  Healthy foods include fruit, vegetables, whole-grain breads, low-fat dairy products, beans, lean meat, and fish  Ask if you need to be on a special diet  Your healthcare provider may recommend that you eat high-fiber foods such as cooked beans  Fiber helps you have regular bowel movements  · Drink liquids as directed  Adults should drink between 9 and 13 eight-ounce cups of liquid every day  Ask what amount is best for you  For most people, good liquids to drink are water, juice, and milk  · Exercise as directed  Talk to your healthcare provider about the best exercise plan for you  Exercise can help prevent constipation, decrease your blood pressure and improve your health  Follow up with your healthcare provider as directed:  Write down your questions so you remember to ask them during your visits  © 2017 [address] Information is for End User's use only and may not be sold, redistributed or otherwise used for commercial purposes  All illustrations and images included in CareNotes® are the copyrighted property of [employer]  or [employer]ack[employer] Tomlin  The above information is an  only   It is not intended as medical advice for individual conditions or treatments  Talk to your doctor, nurse or pharmacist before following any medical regimen to see if it is safe and effective for you  Colorectal Polyps   WHAT YOU NEED TO KNOW:   What are colorectal polyps? Colorectal polyps are small growths of tissue in the lining of the colon and rectum  Most polyps are hyperplastic polyps and are usually benign (noncancerous)  Certain types of polyps, called adenomatous polyps, may turn into cancer  What increases my risk of colorectal polyps? The exact cause of colorectal polyps is unknown  The following may increase your risk:  · Older age    · A diet of foods high in fat and low in fiber     · Family history of polyps    · Intestinal diseases, such as Crohn's disease or ulcerative colitis    · An unhealthy lifestyle, such as physical inactivity, smoking, or drinking alcohol    · Obesity  What are the signs and symptoms of colorectal polyps? · Blood in your bowel movement or bleeding from the rectum    · Change in bowel movement habits, such as diarrhea and constipation    · Abdominal pain  How are colorectal polyps diagnosed? You should have fecal blood screening once a year for colorectal disease if you are over 48years old  You should be screened earlier if you have an intestinal disease or a family history of polyps or colorectal cancer  During this screening, a sample of your bowel movement is checked for blood, which may be an early sign of colorectal polyps or cancer  You may also need any of the following tests:  · Digital rectal exam:  Your healthcare provider will examine your anus and use a finger to check your rectum for polyps  · Barium enema: A barium enema is an x-ray of the colon  A tube is put into your anus, and a liquid called barium is put through the tube  Barium is used so that caregivers can see your colon better on the x-ray film  · Virtual colonoscopy:   This is a CT scan that takes pictures of the inside of your colon and rectum  A small, flexible tube is put into your rectum and air or carbon dioxide (gas) is used to expand your colon  This lets healthcare providers clearly see your colon and any polyps on a monitor  · Colonoscopy or sigmoidoscopy: These procedures help your healthcare provider see the inside of your colon using a flexible tube with a small light and camera on the end  During a sigmoidoscopy, your healthcare provider will only look at rectum and lower colon  During a colonoscopy, healthcare providers will look at the full length of your colon  Healthcare providers may remove a small amount of tissue from the colon for a biopsy  How are colorectal polyps treated? · Polyp removal:  Polyps may be removed during your sigmoidoscopy or colonoscopy  · Polypectomy: This is surgery to remove your polyps  You may need laparoscopic or open surgery, depending on the type, size, and number of polyps that you have  Laparoscopy is done by inserting a small, flexible scope into incisions made on your abdomen  Open surgery is done by making a larger incision on your abdomen   What are the risks of colorectal polyps? You may bleed during a colonoscopy procedure  Your bowel may be perforated (torn) when polyps are removed  This may lead to an open abdominal surgery  During surgery, you may bleed too much or get an infection  Adenomatous polyps that are not removed may turn into cancer and become more difficult to treat  Where can I find support and more information? · [facility] ([facility]) [address], [place] [address]  Phone: [phone]  Web Address: Yadira Pacheco  [facility] gov  When should I contact my healthcare provider? · You have a fever  · You have chills, a cough, or feel weak and achy  · You have abdominal pain that does not go away or gets worse after you take medicine  · Your abdomen is swollen       · You are losing weight without trying  · You have questions or concerns about your condition or care  When should I seek immediate care or call 911? · You have sudden shortness of breath  · You have a fast heart rate, fast breathing, or are too dizzy to stand up  · You have severe abdominal pain  · You see blood in your bowel movement  CARE AGREEMENT:   You have the right to help plan your care  Learn about your health condition and how it may be treated  Discuss treatment options with your caregivers to decide what care you want to receive  You always have the right to refuse treatment  The above information is an  only  It is not intended as medical advice for individual conditions or treatments  Talk to your doctor, nurse or pharmacist before following any medical regimen to see if it is safe and effective for you  © 2017 [address] Information is for End User's use only and may not be sold, redistributed or otherwise used for commercial purposes  All illustrations and images included in CareNotes® are the copyrighted property of [employer]  or [employer]ackery Tomlin  [employer]   WHAT YOU NEED TO KNOW:   What is diverticulosis? Diverticulosis is a condition that causes small pockets called diverticula to form in your intestine  These pockets make it difficult for bowel movements to pass through your digestive system  What causes diverticulosis? Diverticula form when muscles have to work hard to move bowel movements through the intestine  The force causes bulges to form at weak areas in the intestine  This may happen if you eat foods that are low in fiber  Fiber helps give your bowel movements more bulk so they are larger and easier to move through your colon  The following may increase your risk of diverticulosis:  · A history of constipation    · Age 36 or older    · Obesity    · Lack of exercise  What are the signs and symptoms of diverticulosis?   Diverticulosis usually does not cause any signs or symptoms  It may cause any of the following in some people:  · Pain or discomfort in your lower abdomen    · Abdominal bloating    · Constipation or diarrhea  How is diverticulosis diagnosed? Your healthcare provider will examine you and ask about your bowel movements, diet, and symptoms  He or she will also ask about any medical conditions you have or medicines you take  You may need any of the following:  · Blood tests  may be done to check for signs of inflammation  · A barium enema  is an x-ray of your colon that may show diverticula  A tube is put into your anus, and a liquid called barium is put through the tube  Barium is used so that healthcare providers can see your colon more clearly  · Flexible sigmoidoscopy  is a test to look for any changes in your lower intestines and rectum  It may also show the cause of any bleeding or pain  A soft, bendable tube with a light on the end will be put into your anus  It will then be moved forward into your intestine  · A colonoscopy  is used to look at your whole colon  A scope (long bendable tube with a light on the end) is used to take pictures  This test may show diverticula  · A CT scan , or CAT scan, may show diverticula  You may be given contrast liquid before the scan  Tell the healthcare provider if you have ever had an allergic reaction to contrast liquid  How is diverticulosis managed? The goal of treatment is to manage any symptoms you have and prevent other problems such as diverticulitis  Diverticulitis is swelling or infection of the diverticula  Your healthcare provider may recommend any of the following:  · Eat a variety of high-fiber foods  High-fiber foods help you have regular bowel movements  High-fiber foods include cooked beans, fruits, vegetables, and some cereals  Most adults need 25 to 35 grams of fiber each day  Your healthcare provider may recommend that you have more   Ask your healthcare provider how much fiber you need  Increase fiber slowly  You may have abdominal discomfort, bloating, and gas if you add fiber to your diet too quickly  You may need to take a fiber supplement if you are not getting enough fiber from food  · Medicines  to soften your bowel movements may be given  You may also need medicines to treat symptoms such as bloating and pain  · Drink liquids as directed  You may need to drink 2 to 3 liters (8 to 12 cups) of liquids every day  Ask your healthcare provider how much liquid to drink each day and which liquids are best for you  · Apply heat  on your abdomen for 20 to 30 minutes every 2 hours for as many days as directed  Heat helps decrease pain and muscle spasms  How can I help prevent diverticulitis or other symptoms? The following may help decrease your risk for diverticulitis or symptoms, such as bleeding  Talk to your provider about these or other things you can do to prevent problems that may occur with diverticulosis  · Exercise regularly  Ask your healthcare provider about the best exercise plan for you  Exercise can help you have regular bowel movements  Get 30 minutes of exercise on most days of the week  · Maintain a healthy weight  Ask your healthcare provider how much you should weigh  Ask him or her to help you create a weight loss plan if you are overweight  · Do not smoke  Nicotine and other chemicals in cigarettes increase your risk for diverticulitis  Ask your healthcare provider for information if you currently smoke and need help to quit  E-cigarettes or smokeless tobacco still contain nicotine  Talk to your healthcare provider before you use these products  · Ask your healthcare provider if it is safe to take NSAIDs  NSAIDs may increase your risk of diverticulitis  When should I seek immediate care? · You have severe pain on the left side of your lower abdomen  · Your bowel movements are bright or dark red    When should I contact my healthcare provider? · You have a fever and chills  · You feel dizzy or lightheaded  · You have nausea, or you are vomiting  · You have a change in your bowel movements  · You have questions or concerns about your condition or care  CARE AGREEMENT:   You have the right to help plan your care  Learn about your health condition and how it may be treated  Discuss treatment options with your caregivers to decide what care you want to receive  You always have the right to refuse treatment  The above information is an  only  It is not intended as medical advice for individual conditions or treatments  Talk to your doctor, nurse or pharmacist before following any medical regimen to see if it is safe and effective for you  © 2017 [address]  Information is for End User's use only and may not be sold, redistributed or otherwise used for commercial purposes  All illustrations and images included in CareNotes® are the copyrighted property of A [employer] M [employer]  or [employer]ackery Tomlin

## 2018-03-08 NOTE — ANESTHESIA PREPROCEDURE EVALUATION
Review of Systems/Medical History  Patient summary reviewed  Chart reviewed  No history of anesthetic complications     Cardiovascular  Negative cardio ROS    Pulmonary  Smoker ex-smoker  ,        GI/Hepatic  Negative GI/hepatic ROS   Bowel prep            Endo/Other    Obesity  morbid obesity   GYN  Negative gynecology ROS          Hematology  Negative hematology ROS      Musculoskeletal  Back pain , lumbar pain,        Neurology    Neuromuscular disease ,    Psychology   Negative psychology ROS              Physical Exam    Airway    Mallampati score: II  TM Distance: >3 FB  Neck ROM: full     Dental   No notable dental hx     Cardiovascular  Comment: Negative ROS, Rhythm: regular, Rate: normal, Cardiovascular exam normal    Pulmonary  Pulmonary exam normal Breath sounds clear to auscultation,     Other Findings        Anesthesia Plan  ASA Score- 3     Anesthesia Type- IV sedation with anesthesia with ASA Monitors  Additional Monitors:   Airway Plan:         Plan Factors-Patient not instructed to abstain from smoking on day of procedure  Patient did not smoke on day of surgery  Induction- intravenous  Postoperative Plan-     Informed Consent- Anesthetic plan and risks discussed with patient  I personally reviewed this patient with the CRNA  Discussed and agreed on the Anesthesia Plan with the CRNA  Jose Alfredo Vega

## 2018-03-08 NOTE — H&P
History and Physical -  [facility]  Israel Iniguez 65 y o  female MRN: [id number]                  HPI: Israel Iniguez is a 65y o  year old female who presents for colon cancer screening  For colonoscopy  REVIEW OF SYSTEMS: Per the HPI, and otherwise unremarkable  Historical Information   Past Medical History:   Diagnosis Date    Acute atopic conjunctivitis of both eyes     Chronic constipation     Lumbago     Morbid obesity with BMI of 40 0-44 9, adult (HCC)     Piriformis syndrome of right side      Past Surgical History:   Procedure Laterality Date    INCISION TENDON SHEATH HAND       Social History   History   Alcohol Use    Yes     Comment: Occasional     History   Drug Use No     History   Smoking Status    Former Smoker   Smokeless Tobacco    Never Used     Family History   Problem Relation Age of Onset    Colon cancer Mother     Diabetes Mother     Fibromyalgia Mother     Liver disease Mother     Substance Abuse Mother     Mental illness Mother     Heart disease Father     Colon cancer Father     Liver disease Father     Substance Abuse Father     Mental illness Father     Diabetes Brother        Meds/Allergies     Prescriptions Prior to Admission   Medication    Docusate Sodium 100 MG/5ML ENEM    meloxicam (MOBIC) 7 5 mg tablet       Allergies   Allergen Reactions    Ibuprofen Hives    Naproxen Hives       Objective     Blood pressure 144/87, pulse 79, temperature 97 8 °F (36 6 °C), temperature source Tympanic, resp  rate 14, height 5' 3" (1 6 m), weight 101 kg (223 lb), SpO2 99 %  PHYSICAL EXAM    Gen: NAD  CV: RRR  CHEST: Clear  ABD: soft, NT/ND  EXT: no edema      ASSESSMENT/PLAN:  This is a 65y o  year old female here for colon cancer screening        PLAN: colonoscopy

## 2018-03-08 NOTE — OP NOTE
OPERATIVE REPORT  PATIENT NAME: Ramona Carty    :  1960  MRN: [id number]  Pt Location: [facility]    SURGERY DATE: 3/8/2018    Surgeon(s) and Role:     Susana Mayer, DO - Primary     * Darcy Living - Assisting    Preop Diagnosis:  Special screening for malignant neoplasms, colon [Z12 11]    Post-Op Diagnosis Codes: * Special screening for malignant neoplasms, colon [Z12 11]    Procedure(s) (LRB):  COLONOSCOPY with polypectomy and tattoo (N/A)    Specimen(s):  ID Type Source Tests Collected by Time Destination   1 : transverse colon polyp Tissue Large Intestine, Transverse Colon TISSUE EXAM Natali Chaput, DO 3/8/2018 1124    2 : Sigmoid polyp at 30cm Tissue Large Intestine, Sigmoid Colon TISSUE EXAM Natali Chaput, DO 3/8/2018 1133    3 : Rectal polyps x3 Tissue Colon TISSUE EXAM Len Begin, DO 3/8/2018 1137        Estimated Blood Loss:   Minimal    Drains:       Anesthesia Type:   Choice    Operative Indications:  Special screening for malignant neoplasms, colon [Z12 11]      Operative Findings:    Colonoscopy Procedure Note    Procedure: Colonoscopy    Sedation: Monitored anesthesia care, check anesthesia records      ASA Class: 3    INDICATIONS: Screening for Colon Cancer    POST-OP DIAGNOSIS: See the impression below    Procedure Details     Prior colonoscopy: No prior colonoscopy  Informed consent was obtained for the procedure, including sedation  Risks of perforation, hemorrhage, adverse drug reaction and aspiration were discussed  The patient was placed in the left lateral decubitus position  Based on the pre-procedure assessment, including review of the patient's medical history, medications, allergies, and review of systems, she had been deemed to be an appropriate candidate for conscious sedation; she was therefore sedated with the medications listed below     The patient was monitored continuously with telemetry, pulse oximetry, blood pressure monitoring, and direct observations  A rectal examination was performed  The colonoscope was inserted into the rectum and advanced under direct vision to the cecum, which was identified by the ileocecal valve and appendiceal orifice  The quality of the colonic preparation was good  A careful inspection was made as the colonoscope was withdrawn, including a retroflexed view of the rectum; findings and interventions are described below  Findings:  2 cm sessile polyp in transverse colon  Removed in piecemeal fashion  Site tattoed  3 cm pedunculated polyp in the sigmoid colon  Removed with hot snare polypectomy  Site tattoed  Three subcentimeter polyps removed from the rectum  Removed with cold snare polypectomy  Complications:  None; patient tolerated the procedure well  Impression:    Multiple polyps removed  Two large polyps removed and site tattooed  Recommendations:  Repeat colonoscopy in 6 months due to polypectomy in piecemeal fashion  Await pathology results          SIGNATURE: Ronn Ybarra DO  DATE: March 8, 2018  TIME: 11:43 AM

## 2018-03-09 ENCOUNTER — APPOINTMENT (OUTPATIENT)
Dept: PHYSICAL THERAPY | Facility: CLINIC | Age: 58
End: 2018-03-09
Payer: COMMERCIAL

## 2018-03-12 ENCOUNTER — PREP FOR PROCEDURE (OUTPATIENT)
Dept: GASTROENTEROLOGY | Facility: MEDICAL CENTER | Age: 58
End: 2018-03-12

## 2018-03-12 DIAGNOSIS — D12.2 ADENOMA OF ASCENDING COLON: Primary | ICD-10-CM

## 2018-03-13 ENCOUNTER — OFFICE VISIT (OUTPATIENT)
Dept: PHYSICAL THERAPY | Facility: CLINIC | Age: 58
End: 2018-03-13
Payer: COMMERCIAL

## 2018-03-13 DIAGNOSIS — M25.562 ACUTE PAIN OF LEFT KNEE: Primary | ICD-10-CM

## 2018-03-13 PROCEDURE — 97110 THERAPEUTIC EXERCISES: CPT

## 2018-03-13 PROCEDURE — 97112 NEUROMUSCULAR REEDUCATION: CPT

## 2018-03-13 NOTE — PROGRESS NOTES
Daily Note     Today's date: 3/13/2018  Patient name: Aracely Irving  : 1960  MRN: [id number]  Referring provider: France Booker DO  Dx:   Encounter Diagnosis     ICD-10-CM    1  Acute pain of left knee M25 562                   Subjective: Patient presents today reporting L knee has felt "very achy" over the past week since previous session  She notes that if she is sitting still for a while it starts to ache and then it is difficult for her to stand up and move around, but gets easier as she loosens it up  Objective: See treatment diary below  Progressed TE today  Assessment: Tolerated treatment well  Good performance and tolerance of new exercises and increased volume of TE today  Patient continues to have pain when SLS on LLE, modified to avoid this stance this session  Patient demonstrated fatigue post treatment, exhibited good technique with therapeutic exercises and would benefit from continued PT  Plan: Continue per plan of care  Progress treatment as tolerated          Precautions: none     Daily Treatment Diary      Manual   2/27  3/6  3/13                 Patellar mobs  AN  NP  NP                 PA Tib-fem mobs with TKE  AN  NP  NP                    10'                                                                           Exercise Diary   2/27 3/6  3/13                 Heel slides D/C HEP                    SAQ 3" x10 3" x20 3" x30                 Calf stretch 30" x3 30" x3 30"x3                 HS stretch 30" x3 30" x3 30"x3                 Glute bridge 2x10 2x10 2x12                 SLR flex 2x10 2x10 3x10                 SLR abduction 2x10 2x10 3x10                 Iso hip abd/add 3" x 20 ea 3" x30 ea Pink/ Ball 3"x30                  Clam shells NV 1x15 2x10                 LAQ NV 2# x10 2# x20                 Rocker board balance NP AP/ML 1' ea AP/ML 1' ea                 Rocker board shifts AP/ML 20 ea AP/ML 30 ea AP/ML 30 ea                 Standing abduction/ext --/2x10 2x10 ea 3x10                 Mini squats 2x10 1x10 2x10                 Standing HS curls 2# 2x10 2# 3x10 2# 3x10                 Side-stepping   20' x4                 HR/TR   30 ea 30 ea                 CC TKE   red 5"x30 Red 5" x30                                              Modalities                        MHP/CP prn

## 2018-03-15 ENCOUNTER — OFFICE VISIT (OUTPATIENT)
Dept: PHYSICAL THERAPY | Facility: CLINIC | Age: 58
End: 2018-03-15
Payer: COMMERCIAL

## 2018-03-15 DIAGNOSIS — M25.562 ACUTE PAIN OF LEFT KNEE: Primary | ICD-10-CM

## 2018-03-15 PROCEDURE — 97150 GROUP THERAPEUTIC PROCEDURES: CPT | Performed by: PHYSICAL THERAPIST

## 2018-03-15 PROCEDURE — 97110 THERAPEUTIC EXERCISES: CPT | Performed by: PHYSICAL THERAPIST

## 2018-03-15 PROCEDURE — 97112 NEUROMUSCULAR REEDUCATION: CPT | Performed by: PHYSICAL THERAPIST

## 2018-03-15 NOTE — PROGRESS NOTES
Daily Note     Today's date: 3/15/2018  Patient name: Mu Longoria  : 1960  MRN: [id number]  Referring provider: Chase Mcwilliams DO  Dx:   Encounter Diagnosis     ICD-10-CM    1  Acute pain of left knee M25 562                   Subjective: Patient pt feel her L knee is pain is worsening  Max pain 7/10 yesterday  Pain not localized to anterior knee  Objective: See treatment diary below  Focus on LE strengthening  Assessment: Tolerated treatment well  Symptoms consistent with PFPS  Has had history of sciatica, can not r/o lumbar radiculopathy, but not likely the cause  Patient demonstrated fatigue post treatment, exhibited good technique with therapeutic exercises and would benefit from continued PT  Plan: Continue per plan of care  Progress treatment as tolerated          Precautions: none     Daily Treatment Diary      Manual   2/27  3/6  3/13  3/15               Patellar mobs  AN  NP  NP  NP               PA Tib-fem mobs with TKE  AN  NP  NP  NP                  10'                                                                           Exercise Diary   2/27 3/6  3/13  3/15               Heel slides D/C HEP                    SAQ 3" x10 3" x20 3" x30  3" x 30               Calf stretch 30" x3 30" x3 30"x3  30 sec x 3 (prostretch)               HS stretch 30" x3 30" x3 30"x3  30 sec x 3               Glute bridge 2x10 2x10 2x12  2 x 12               SLR flex 2x10 2x10 3x10  3 x 10               SLR abduction 2x10 2x10 3x10  3x 10               Iso hip abd/add 3" x 20 ea 3" x30 ea Pink/ Ball 3"x30   ball x 30, 3"               Clam shells NV 1x15 2x10 2 x 10               LAQ NV 2# x10 2# x20  2#, 3 x 10               Rocker board balance NP AP/ML 1' ea AP/ML 1' ea  AP/ML 1' ea               Rocker board shifts AP/ML 20 ea AP/ML 30 ea AP/ML 30 ea  AP/ML 30 ea               Standing abduction/ext --/2x10 2x10 ea 3x10  3 x 10               Mini squats 2x10 1x10 2x10  2 x 10 with ball sq               Standing HS curls 2# 2x10 2# 3x10 2# 3x10  2#, 3 x 10               Side-stepping   20' x4  NP               HR/TR   30 ea 30 ea  x 30                CC TKE   red 5"x30 Red 5" x30  red, x 30, 5"                                            Modalities                        MHP/CP prn

## 2018-03-20 ENCOUNTER — OFFICE VISIT (OUTPATIENT)
Dept: PHYSICAL THERAPY | Facility: CLINIC | Age: 58
End: 2018-03-20
Payer: COMMERCIAL

## 2018-03-20 DIAGNOSIS — M25.562 ACUTE PAIN OF LEFT KNEE: Primary | ICD-10-CM

## 2018-03-20 PROCEDURE — 97110 THERAPEUTIC EXERCISES: CPT

## 2018-03-20 PROCEDURE — G8990 OTHER PT/OT CURRENT STATUS: HCPCS

## 2018-03-20 PROCEDURE — G8991 OTHER PT/OT GOAL STATUS: HCPCS

## 2018-03-20 PROCEDURE — 97112 NEUROMUSCULAR REEDUCATION: CPT

## 2018-03-20 NOTE — PROGRESS NOTES
Daily Note     Today's date: 3/20/2018  Patient name: Eleazar Olivares  : 1960  MRN: [id number]  Referring provider: Cristina Kumar DO  Dx:   Encounter Diagnosis     ICD-10-CM    1  Acute pain of left knee M25 562                   Subjective: Patient reports knee pain has been less intense than last week  She reports she ordered a lateral J brace after last visit, however ordered the wrong size so has not worn it yet and is waiting for correct size  Objective: See treatment diary below  Assessment: Tolerated treatment well  Symptoms in posterior knee with mini squats with ball squeeze  Patient demonstrated fatigue post treatment, exhibited good technique with therapeutic exercises and would benefit from continued PT  Plan: Continue per plan of care  Progress treatment as tolerated          Precautions: none     Daily Treatment Diary      Manual   2/27  3/6  3/13  3/15  3/20             Patellar mobs  AN  NP  NP  NP  NP             PA Tib-fem mobs with TKE  AN  NP  NP  NP  NP                10'                                                                           Exercise Diary   2/27 3/6  3/13  3/15  3/20             Heel slides D/C HEP                    SAQ 3" x10 3" x20 3" x30  3" x 30 3"x30             Calf stretch 30" x3 30" x3 30"x3  30 sec x 3 (prostretch) 30" x3             HS stretch 30" x3 30" x3 30"x3  30 sec x 3 30" x3             Glute bridge 2x10 2x10 2x12  2 x 12 3x10             SLR flex 2x10 2x10 3x10  3 x 10 3x10             SLR abduction 2x10 2x10 3x10  3x 10 3x10             Iso hip abd/add 3" x 20 ea 3" x30 ea Pink/ Ball 3"x30   ball x 30, 3" Pink/ball 3"x30             Clam shells NV 1x15 2x10 2 x 10 2x15             LAQ NV 2# x10 2# x20  2#, 3 x 10 2# x30             Rocker board balance NP AP/ML 1' ea AP/ML 1' ea  AP/ML 1' ea AP/ML 1 5' ea             Rocker board shifts AP/ML 20 ea AP/ML 30 ea AP/ML 30 ea  AP/ML 30 ea AP/ML 30 ea             Standing abduction/ext --/2x10 2x10 ea 3x10  3 x 10 3x10             Mini squats 2x10 1x10 2x10  2 x 10 with ball sq 2x10 with ball squeeze             Standing HS curls 2# 2x10 2# 3x10 2# 3x10  2#, 3 x 10 2# x30             Side-stepping   20' x4  NP NP             HR/TR   30 ea 30 ea  x 30  x30 ea             CC TKE   red 5"x30 Red 5" x30  red, x 30, 5" Red 5"x30             TB HS curls        Pink x10                   Modalities                        MHP/CP prn

## 2018-03-22 ENCOUNTER — OFFICE VISIT (OUTPATIENT)
Dept: PHYSICAL THERAPY | Facility: CLINIC | Age: 58
End: 2018-03-22
Payer: COMMERCIAL

## 2018-03-22 DIAGNOSIS — M25.562 ACUTE PAIN OF LEFT KNEE: Primary | ICD-10-CM

## 2018-03-22 PROCEDURE — 97112 NEUROMUSCULAR REEDUCATION: CPT

## 2018-03-22 PROCEDURE — 97110 THERAPEUTIC EXERCISES: CPT

## 2018-03-22 NOTE — PROGRESS NOTES
PT Re-Evaluation     Today's date: 3/22/2018  Patient name: Andrew Padgett  : 1960  MRN: [id number]  Referring provider: Estefanía Rodriguez DO  Dx:   Encounter Diagnosis     ICD-10-CM    1  Acute pain of left knee M25 562                   Assessment  Impairments: abnormal or restricted ROM, activity intolerance, impaired physical strength and pain with function  Functional limitations: difficulty descending stairs, difficulty completing car transfers into 's seat  Assessment details: Andrew Padgett has been compliant with attending PT and HEP since initial eval  Flaca Portillo has made improvements in objective data since IE but is still limited compared to normal  Flaca Portillo continues with above listed impairments and would benefit from additional skilled PT to address these deficits to return to PLOF  Understanding of Dx/Px/POC: good   Prognosis: good    Goals  STG  1) L knee ROM will improve 50% in 3 weeks  -Met  2) L knee strength will improve 1/2 grade in 3 weeks  -Met  3) B/L hip strength will improve 1/2 grade in 3 weeks  -Met  UPDATED STG  1) B/L hip strength will be 5/5 in 3 weeks  2) L knee strength will be 5/5 in 3 weeks  LTG  1) Patient is independent in HEP   -Partially met  2) Patient will ascend/descend one flight of stairs independently with no increased pain in 6 weeks  -Not met  3) Ambulation will be improved to PLOF in 6 weeks  -Not met  4) Car transfers will improve to PLOF in 6 weeks  -Not met    Plan  Patient would benefit from: skilled PT  Planned modality interventions: cryotherapy and thermotherapy: hydrocollator packs  Planned therapy interventions: balance/weight bearing training, home exercise program, therapeutic exercise, therapeutic activities, stretching, strengthening, patient education, neuromuscular re-education, manual therapy and joint mobilization  Frequency: 2x week  Duration in weeks: 4  Treatment plan discussed with: patient        Subjective Evaluation    History of Present Illness  Mechanism of injury: Patient reports her knee continues to feel sore although at a lesser intensity overall  She reports she mostly feels the pain when she stands up from sitting for a while  She also still descends stairs with LLE first, using a step to step pattern  Quality of life: good    Pain  Current pain ratin  At best pain ratin  At worst pain ratin  Quality: discomfort and dull ache  Aggravating factors: sitting and walking  Progression: improved    Patient Goals  Patient goals for therapy: decreased pain, increased strength, independence with ADLs/IADLs, improved balance and return to sport/leisure activities          Objective     Tenderness   Left Knee   No tenderness in the lateral joint line and medial joint line  Active Range of Motion   Left Knee   Flexion: 128 degrees   Extension: -4 degrees     Right Knee   Flexion: 130 degrees   Extension: -3 degrees     Passive Range of Motion   Left Knee   Flexion: 130 degrees   Extension: 0 degrees     Mobility   Patellar Mobility:   Left Knee   WFL: medial, lateral, superior and inferior  Strength/Myotome Testing     Left Knee   Flexion: 4+  Extension: 4+  Quadriceps contraction: good    Additional Strength Details  Hip flexion and abduction strength 4+/5 B/L    Tests     Left Knee   Positive lateral Orestes  Negative anterior drawer, patellar compression, valgus stress test at 30 degrees and varus stress test at 30 degrees  Right Knee   Negative lateral Orestes and patellar compression  General Comments     Knee Comments  FOTO score improved from 35 to 48 in 6 visits           Precautions: none     Daily Treatment Diary      Manual   2/27  3/6  3/13  3/15  3/20  3/22           Patellar mobs  AN  NP  NP  NP  NP  NP           PA Tib-fem mobs with TKE  AN  NP  NP  NP  NP  NP              10'                                                                           Exercise Diary   2/27 3/6  3/13  3/15  3/20  3/22         Heel slides D/C HEP                    SAQ 3" x10 3" x20 3" x30  3" x 30 3"x30 1# 2x10           Calf stretch 30" x3 30" x3 30"x3  30 sec x 3 (prostretch) 30" x3 30"x3           HS stretch 30" x3 30" x3 30"x3  30 sec x 3 30" x3 30"x3           Glute bridge 2x10 2x10 2x12  2 x 12 3x10 3x10           SLR flex 2x10 2x10 3x10  3 x 10 3x10 3x10           SLR abduction 2x10 2x10 3x10  3x 10 3x10 3x10           Iso hip abd/add 3" x 20 ea 3" x30 ea Pink/ Ball 3"x30   ball x 30, 3" Pink/ball 3"x30 Pink/ball 3"x30           Clam shells NV 1x15 2x10 2 x 10 2x15 2x15           LAQ NV 2# x10 2# x20  2#, 3 x 10 2# x30 2# x30           Rocker board balance NP AP/ML 1' ea AP/ML 1' ea  AP/ML 1' ea AP/ML 1 5' ea AP/ML 1 5' ea           Rocker board shifts AP/ML 20 ea AP/ML 30 ea AP/ML 30 ea  AP/ML 30 ea AP/ML 30 ea AP/ML 30 ea           Standing abduction/ext --/2x10 2x10 ea 3x10  3 x 10 3x10 D/C           Mini squats 2x10 1x10 2x10  2 x 10 with ball sq 2x10 with ball squeeze 2x10 with ball sq           Standing HS curls 2# 2x10 2# 3x10 2# 3x10  2#, 3 x 10 2# x30 2# x30           Side-stepping     20' x4  NP NP NV           HR/TR   30 ea 30 ea  x 30  x30 ea x30           CC TKE   red 5"x30 Red 5" x30  red, x 30, 5" Red 5"x30 Red 5"x30           TB HS curls         Pink x10 Pink x20                 Modalities                        MHP/CP prn

## 2018-03-27 ENCOUNTER — OFFICE VISIT (OUTPATIENT)
Dept: PHYSICAL THERAPY | Facility: CLINIC | Age: 58
End: 2018-03-27
Payer: COMMERCIAL

## 2018-03-27 DIAGNOSIS — M25.562 ACUTE PAIN OF LEFT KNEE: Primary | ICD-10-CM

## 2018-03-27 PROCEDURE — 97110 THERAPEUTIC EXERCISES: CPT

## 2018-03-27 PROCEDURE — 97112 NEUROMUSCULAR REEDUCATION: CPT

## 2018-03-27 NOTE — PROGRESS NOTES
Daily Note     Today's date: 3/27/2018  Patient name: Janie Traylor  : 1960  MRN: [id number]  Referring provider: Abby Morfin DO  Dx:   Encounter Diagnosis     ICD-10-CM    1  Acute pain of left knee M25 562                   Subjective: Patient presents with noted increased L knee discomfort today after visiting family out of town over the weekend where she was more active and did not take any medication for pain until bed last night  Objective: See treatment diary below  Assessment: Tolerated treatment well  Able to complete all exercises with increased volume and resistance with no discomfort or pain throughout or at end of session  Patient demonstrated fatigue post treatment, exhibited good technique with therapeutic exercises and would benefit from continued PT  Plan: Continue per plan of care  Progress treatment as tolerated          Precautions: none     Daily Treatment Diary      Manual   2/27  3/6  3/13  3/15  3/20  3/22  3/27         Patellar mobs  AN  NP  NP  NP  NP  NP NP         PA Tib-fem mobs with TKE  AN  NP  NP  NP  NP  NP NP            10'                                                                           Exercise Diary   2/27 3/6  3/13  3/15  3/20  3/22  3/27         Bike            5 min         SAQ 3" x10 3" x20 3" x30  3" x 30 3"x30 1# 2x10 2# 2x10         Calf stretch 30" x3 30" x3 30"x3  30 sec x 3 (prostretch) 30" x3 30"x3 30"x3         HS stretch 30" x3 30" x3 30"x3  30 sec x 3 30" x3 30"x3 30"x3         Glute bridge 2x10 2x10 2x12  2 x 12 3x10 3x10 3x10         SLR flex 2x10 2x10 3x10  3 x 10 3x10 3x10 1# 2x10         SLR abduction 2x10 2x10 3x10  3x 10 3x10 3x10 1# 2x10         Iso hip abd/add 3" x 20 ea 3" x30 ea Pink/ Ball 3"x30   ball x 30, 3" Pink/ball 3"x30 Pink/ball 3"x30 Pink/ball 3"x30         Clam shells NV 1x15 2x10 2 x 10 2x15 2x15 2x15         LAQ NV 2# x10 2# x20  2#, 3 x 10 2# x30 2# x30 2# x30         Rocker board balance NP AP/ML 1' ea AP/ML 1' ea  AP/ML 1' ea AP/ML 1 5' ea AP/ML 1 5' ea AP/ML 1 5' ea         Rocker board shifts AP/ML 20 ea AP/ML 30 ea AP/ML 30 ea  AP/ML 30 ea AP/ML 30 ea AP/ML 30 ea AP/ML 30 ea         Standing abduction/ext --/2x10 2x10 ea 3x10  3 x 10 3x10 D/C ----         Mini squats 2x10 1x10 2x10  2 x 10 with ball sq 2x10 with ball squeeze 2x10 with ball sq 2x10 with ball sq         Standing HS curls 2# 2x10 2# 3x10 2# 3x10  2#, 3 x 10 2# x30 2# x30 2# x30         Side-stepping     20' x4  NP NP NV NV         HR/TR   30 ea 30 ea  x 30  x30 ea x30 x30         CC TKE   red 5"x30 Red 5" x30  red, x 30, 5" Red 5"x30 Red 5"x30 Red 5"x30         TB HS curls         Pink x10 Pink x20 Pink x30               Modalities                        MHP/CP prn

## 2018-03-29 ENCOUNTER — OFFICE VISIT (OUTPATIENT)
Dept: PHYSICAL THERAPY | Facility: CLINIC | Age: 58
End: 2018-03-29
Payer: COMMERCIAL

## 2018-03-29 DIAGNOSIS — M25.562 ACUTE PAIN OF LEFT KNEE: Primary | ICD-10-CM

## 2018-03-29 PROCEDURE — 97110 THERAPEUTIC EXERCISES: CPT

## 2018-03-29 PROCEDURE — 97112 NEUROMUSCULAR REEDUCATION: CPT

## 2018-03-29 NOTE — PROGRESS NOTES
Daily Note     Today's date: 3/29/2018  Patient name: Santo Moore  : 1960  MRN: [id number]  Referring provider: Guilherme Donovan DO  Dx:   Encounter Diagnosis     ICD-10-CM    1  Acute pain of left knee M25 562                 Subjective: Patient reports she did not have pain in her L knee at all yesterday  She woke up today with some soreness in posterior knee rated as 1/10  Objective: See treatment diary below  Progressed resistance of strengthening exercises today  Assessment: Tolerated treatment well  Good performance of exercises today with increased resistance, showing some muscle fatigue but improved endurance to complete exercises and no increase in pain  Patient did have discomfort in L knee with LAQ at beginning of set, which did not last and patient was unable to re-aggravate symptoms  Patient demonstrated fatigue post treatment, exhibited good technique with therapeutic exercises and would benefit from continued PT  Plan: Continue per plan of care  Progress treatment as tolerated          Precautions: none     Daily Treatment Diary      Manual   2/27  3/6  3/13  3/15  3/20  3/22  3/27  3/28       Patellar mobs  AN  NP  NP  NP  NP  NP NP NP       PA Tib-fem mobs with TKE  AN  NP  NP  NP  NP  NP NP NP          10'                                                                           Exercise Diary   2/27 3/6  3/13  3/15  3/20  3/22  3/27  3/28       Bike            5 min 6 min       SAQ 3" x10 3" x20 3" x30  3" x 30 3"x30 1# 2x10 2# 2x10 2# 3x10       Calf stretch 30" x3 30" x3 30"x3 30"x3 30" x3 30"x3 30"x3 30"x3       HS stretch 30" x3 30" x3 30"x3 30"x3 30" x3 30"x3 30"x3 30"x3       Glute bridge 2x10 2x10 2x12 2 x 12 3x10 3x10 3x10 3x10       SLR flex 2x10 2x10 3x10 3 x 10 3x10 3x10 1# 2x10 1# 2x10       SLR abduction 2x10 2x10 3x10 3x 10 3x10 3x10 1# 2x10 1# 2x10       Iso hip abd/add 3" x 20 ea 3" x30 ea Pink/ Ball 3"x30  ball x 30, 3" Pink/ball 3"x30 Pink/ball 3"x30 [place]/ball 3"x30 Green, ball 3"x30       Clam shells NV 1x15 2x10 2 x 10 2x15 2x15 2x15 Pink 2x10       LAQ NV 2# x10 2# x20 2#, 3 x 10 2# x30 2# x30 2# x30 2 5# x20       Rocker board balance NP AP/ML 1' ea AP/ML 1' ea AP/ML 1' ea AP/ML 1 5' ea AP/ML 1 5' ea AP/ML 1 5' ea AP/ML 2' ea       Rocker board shifts AP/ML 20 ea AP/ML 30 ea AP/ML 30 ea  AP/ML 30 ea AP/ML 30 ea AP/ML 30 ea AP/ML 30 ea AP/ML 30 ea       Standing abduction/ext --/2x10 2x10 ea 3x10  3 x 10 3x10 D/C ---- ----       Mini squats 2x10 1x10 2x10  2 x 10 with ball sq 2x10 with ball squeeze 2x10 with ball sq 2x10 with ball sq 2x12 with ball sq       Standing HS curls 2# 2x10 2# 3x10 2# 3x10  2#, 3 x 10 2# x30 2# x30 2# x30 2# x30       Side-stepping     20' x4  NP NP NV NV NP       HR/TR   30 ea 30 ea  x 30  x30 ea x30 x30 x30       CC TKE   red 5"x30 Red 5" x30  red, x 30, 5" Red 5"x30 Red 5"x30 Red 5"x30 Red 5"x30 Green     TB HS curls         Pink x10 Pink x20 Pink x30 Green x20            Modalities                        MHP/CP prn

## 2018-04-03 ENCOUNTER — OFFICE VISIT (OUTPATIENT)
Dept: PHYSICAL THERAPY | Facility: CLINIC | Age: 58
End: 2018-04-03
Payer: COMMERCIAL

## 2018-04-03 DIAGNOSIS — M25.562 ACUTE PAIN OF LEFT KNEE: Primary | ICD-10-CM

## 2018-04-03 PROCEDURE — 97112 NEUROMUSCULAR REEDUCATION: CPT

## 2018-04-03 PROCEDURE — 97110 THERAPEUTIC EXERCISES: CPT

## 2018-04-03 NOTE — PROGRESS NOTES
Daily Note     Today's date: 4/3/2018  Patient name: Cale Edwards  : 1960  MRN: [id number]  Referring provider: Loreta Delgado DO  Dx:   Encounter Diagnosis     ICD-10-CM    1  Acute pain of left knee M25 562                 Subjective: Patient states she noticed improved gait pattern over this past weekend, not limping as she previously was and was not having any increased pain ambulating  Objective: See treatment diary below  Assessment: Tolerated treatment well  Good performance of exercises with no knee discomfort noted throughout entire session  Patient demonstrated fatigue post treatment, exhibited good technique with therapeutic exercises and would benefit from continued PT  Plan: Continue per plan of care  Potential discharge next visit        Precautions: none     Daily Treatment Diary      Manual   2/27  3/6  3/13  3/15  3/20  3/22  3/27  3/28  4/3     Patellar mobs  AN  NP  NP  NP  NP  NP NP NP NP     PA Tib-fem mobs with TKE  AN  NP  NP  NP  NP  NP NP NP NP        10'                                                                           Exercise Diary   2/27 3/6  3/13  3/15  3/20  3/22  3/27  3/28  4/3     Bike            5 min 6 min 7 min     SAQ 3" x10 3" x20 3" x30  3" x 30 3"x30 1# 2x10 2# 2x10 2# 3x10 2 5# 3x10     Calf stretch 30" x3 30" x3 30"x3 30"x3 30" x3 30"x3 30"x3 30"x3 30"x3     HS stretch 30" x3 30" x3 30"x3 30"x3 30" x3 30"x3 30"x3 30"x3 30"x3     Glute bridge 2x10 2x10 2x12 2 x 12 3x10 3x10 3x10 3x10 3x10     SLR flex 2x10 2x10 3x10 3 x 10 3x10 3x10 1# 2x10 1# 2x10 1# 2x10     SLR abduction 2x10 2x10 3x10 3x 10 3x10 3x10 1# 2x10 1# 2x10 1# 2x10     Iso hip abd/add 3" x 20 ea 3" x30 ea Pink/ Ball 3"x30  ball x 30, 3" Pink/ball 3"x30 Pink/ball 3"x30 Pink/ball 3"x30 Green, ball 3"x30 Green, ball 3"x30     Clam shells NV 1x15 2x10 2 x 10 2x15 2x15 2x15 Pink 2x10 Pink 2x10     LAQ NV 2# x10 2# x20 2#, 3 x 10 2# x30 2# x30 2# x30 2 5# x20 2 5# x20     Rocker board balance NP AP/ML 1' ea AP/ML 1' ea AP/ML 1' ea AP/ML 1 5' ea AP/ML 1 5' ea AP/ML 1 5' ea AP/ML 2' ea AP/ML 2' ea     Rocker board shifts AP/ML 20 ea AP/ML 30 ea AP/ML 30 ea  AP/ML 30 ea AP/ML 30 ea AP/ML 30 ea AP/ML 30 ea AP/ML 30 ea AP/ML 30 ea     Standing abduction/ext --/2x10 2x10 ea 3x10  3 x 10 3x10 D/C ---- ---- ----     Mini squats 2x10 1x10 2x10  2 x 10 with ball sq 2x10 with ball squeeze 2x10 with ball sq 2x10 with ball sq 2x12 with ball sq 2x12     Standing HS curls 2# 2x10 2# 3x10 2# 3x10  2#, 3 x 10 2# x30 2# x30 2# x30 2# x30 2# x30     Side-stepping     20' x4  NP NP NV NV NP NV     HR/TR   30 ea 30 ea  x 30  x30 ea x30 x30 x30 x30     CC TKE   red 5"x30 Red 5" x30  red, x 30, 5" Red 5"x30 Red 5"x30 Red 5"x30 Red 5"x30 Green 5"x20     TB HS curls         Pink x10 Pink x20 Pink x30 Green x20 Green 2x10           Modalities                        MHP/CP prn

## 2018-04-05 ENCOUNTER — OFFICE VISIT (OUTPATIENT)
Dept: PHYSICAL THERAPY | Facility: CLINIC | Age: 58
End: 2018-04-05
Payer: COMMERCIAL

## 2018-04-05 DIAGNOSIS — M25.562 ACUTE PAIN OF LEFT KNEE: Primary | ICD-10-CM

## 2018-04-05 PROCEDURE — 97112 NEUROMUSCULAR REEDUCATION: CPT

## 2018-04-05 PROCEDURE — G8983 BODY POS D/C STATUS: HCPCS

## 2018-04-05 PROCEDURE — G8982 BODY POS GOAL STATUS: HCPCS

## 2018-04-05 PROCEDURE — 97110 THERAPEUTIC EXERCISES: CPT

## 2018-04-05 NOTE — PROGRESS NOTES
PT Discharge    Today's date: 2018  Patient name: Hanna Oglesby  : 1960  MRN: [id number]  Referring provider: Ivis Rceinos DO  Dx:   Encounter Diagnosis     ICD-10-CM    1  Acute pain of left knee M25 562                   Assessment  Impairments: abnormal or restricted ROM, impaired physical strength and pain with function  Functional limitations: difficulty descending stairs  Assessment details: Hanna Oglesby has been compliant with attending PT and HEP since initial eval  Berdine Neighbor has made improvements in objective data since IE and has maximized function  Patient is agreeable to d/c to HEP at this time, and will contact clinic with any exercise related questions or concerns as needed  Understanding of Dx/Px/POC: good   Prognosis: good    Goals  STG  1) L knee ROM will improve 50% in 3 weeks  -Met  2) L knee strength will improve 1/2 grade in 3 weeks  -Met  3) B/L hip strength will improve 1/2 grade in 3 weeks  -Met  UPDATED STG  1) B/L hip strength will be 5/5 in 3 weeks  -Not met  2) L knee strength will be 5/5 in 3 weeks  -Met  LTG  1) Patient is independent in HEP  -Met  2) Patient will ascend/descend one flight of stairs independently with no increased pain in 6 weeks  -Partially met  3) Ambulation will be improved to PLOF in 6 weeks  -Met  4) Car transfers will improve to PLOF in 6 weeks  -Met    Plan  Planned therapy interventions: home exercise program  Treatment plan discussed with: patient        Subjective Evaluation    History of Present Illness  Mechanism of injury: Patient has reported over the past week she feels improvement in the L knee pain, and notes she even ascended/descended the stairs in her home yesterday with step over step pattern with no increase in pain or discomfort  She was able to walk around more last weekend while visiting family with no increased discomfort and felt she had increased endurance overall   She feels comfortable enough to d/c from therapy today and continue with HEP on her own  Quality of life: good    Pain  Current pain ratin  At best pain ratin  At worst pain ratin  Quality: discomfort  Aggravating factors: running  Progression: improved    Patient Goals  Patient goals for therapy: decreased pain, increased strength, independence with ADLs/IADLs, improved balance and return to sport/leisure activities          Objective     Tenderness   Left Knee   No tenderness in the lateral joint line and medial joint line  Active Range of Motion   Left Knee   Flexion: 130 degrees   Extension: -3 degrees     Right Knee   Flexion: 130 degrees   Extension: -3 degrees     Passive Range of Motion   Left Knee   Flexion: 130 degrees   Extension: 0 degrees     Mobility   Patellar Mobility:   Left Knee   WFL: medial, lateral, superior and inferior  Strength/Myotome Testing     Left Knee   Flexion: 5  Extension: 5  Quadriceps contraction: good    Additional Strength Details  Hip flexion and abduction strength 4+/5 B/L    Tests     Left Knee   Negative anterior drawer, lateral Orestes, patellar compression, valgus stress test at 30 degrees and varus stress test at 30 degrees  Right Knee   Negative lateral Orestes and patellar compression  General Comments     Knee Comments  FOTO score improved from 35 to 56 in 11 visits        Precautions: none     Daily Treatment Diary      Manual   2/27  3/6  3/13  3/15  3/20  3/22  3/27  3/28  4/3  4/5   Patellar mobs  AN  NP  NP  NP  NP  NP NP NP NP NP   PA Tib-fem mobs with TKE  AN  NP  NP  NP  NP  NP NP NP NP NP      10'                                                                           Exercise Diary   2/27 3/6  3/13  3/15  3/20  3/22  3/27  3/28  43  4/5   Bike             5 min 6 min 7 min 8 min   SAQ 3" x10 3" x20 3" x30  3" x 30 3"x30 1# 2x10 2# 2x10 2# 3x10 2 5# 3x10 2 5# 3x10   Calf stretch 30" x3 30" x3 30"x3 30"x3 30" x3 30"x3 30"x3 30"x3 30"x3 30"x3   HS stretch 30" x3 30" x3 30"x3 30"x3 30" x3 30"x3 30"x3 30"x3 30"x3 30"x3   Glute bridge 2x10 2x10 2x12 2 x 12 3x10 3x10 3x10 3x10 3x10 3x10   SLR flex 2x10 2x10 3x10 3 x 10 3x10 3x10 1# 2x10 1# 2x10 1# 2x10 1# x30   SLR abduction 2x10 2x10 3x10 3x 10 3x10 3x10 1# 2x10 1# 2x10 1# 2x10 1# x30   Iso hip abd/add 3" x 20 ea 3" x30 ea Pink/ Ball 3"x30  ball x 30, 3" Pink/ball 3"x30 Pink/ball 3"x30 Pink/ball 3"x30 Green, ball 3"x30 Green, ball 3"x30 Green, ball 3"x30   Clam shells NV 1x15 2x10 2 x 10 2x15 2x15 2x15 Pink 2x10 Pink 2x10 Pink x30   LAQ NV 2# x10 2# x20 2#, 3 x 10 2# x30 2# x30 2# x30 2 5# x20 2 5# x20 3# x20   Rocker board balance NP AP/ML 1' ea AP/ML 1' ea AP/ML 1' ea AP/ML 1 5' ea AP/ML 1 5' ea AP/ML 1 5' ea AP/ML 2' ea AP/ML 2' ea AP/ML 2' ea   Rocker board shifts AP/ML 20 ea AP/ML 30 ea AP/ML 30 ea  AP/ML 30 ea AP/ML 30 ea AP/ML 30 ea AP/ML 30 ea AP/ML 30 ea AP/ML 30 ea AP/ML 30 ea   Standing abduction/ext --/2x10 2x10 ea 3x10  3 x 10 3x10 D/C ---- ---- ---- ----   Mini squats 2x10 1x10 2x10  2 x 10 with ball sq 2x10 with ball squeeze 2x10 with ball sq 2x10 with ball sq 2x12 with ball sq 2x12 3x10   Standing HS curls 2# 2x10 2# 3x10 2# 3x10  2#, 3 x 10 2# x30 2# x30 2# x30 2# x30 2# x30 3# x30   Side-stepping     20' x4  NP NP NV NV NP NV NP   HR/TR   30 ea 30 ea  x 30  x30 ea x30 x30 x30 x30 x30   CC TKE   red 5"x30 Red 5" x30  red, x 30, 5" Red 5"x30 Red 5"x30 Red 5"x30 Red 5"x30 Green 5"x20 Green 5"x30   TB HS curls         Pink x10 Pink x20 Pink x30 Green x20 Green 2x10 Green 3x10         Modalities                        MHP/CP prn

## 2018-05-03 ENCOUNTER — OFFICE VISIT (OUTPATIENT)
Dept: GASTROENTEROLOGY | Facility: MEDICAL CENTER | Age: 58
End: 2018-05-03
Payer: COMMERCIAL

## 2018-05-03 VITALS
WEIGHT: 235 LBS | DIASTOLIC BLOOD PRESSURE: 70 MMHG | TEMPERATURE: 97.1 F | HEIGHT: 63 IN | HEART RATE: 68 BPM | BODY MASS INDEX: 41.64 KG/M2 | SYSTOLIC BLOOD PRESSURE: 122 MMHG

## 2018-05-03 DIAGNOSIS — E66.01 MORBID OBESITY WITH BMI OF 40.0-44.9, ADULT (HCC): ICD-10-CM

## 2018-05-03 DIAGNOSIS — D12.3 ADENOMATOUS POLYP OF TRANSVERSE COLON: ICD-10-CM

## 2018-05-03 DIAGNOSIS — K59.04 CHRONIC IDIOPATHIC CONSTIPATION: Primary | ICD-10-CM

## 2018-05-03 PROBLEM — D12.2 ADENOMA OF ASCENDING COLON: Status: ACTIVE | Noted: 2018-05-03

## 2018-05-03 PROCEDURE — 99214 OFFICE O/P EST MOD 30 MIN: CPT | Performed by: PHYSICIAN ASSISTANT

## 2018-05-03 RX ORDER — POLYETHYLENE GLYCOL 3350 17 G/17G
17 POWDER, FOR SOLUTION ORAL DAILY
Qty: 289 G | Refills: 0 | Status: SHIPPED | OUTPATIENT
Start: 2018-05-03

## 2018-05-03 RX ORDER — DOCUSATE SODIUM 100 MG/1
100 CAPSULE, LIQUID FILLED ORAL DAILY
Qty: 90 CAPSULE | Refills: 2 | Status: SHIPPED | OUTPATIENT
Start: 2018-05-03 | End: 2019-11-17 | Stop reason: SDUPTHER

## 2018-05-03 NOTE — PROGRESS NOTES
[facility] - Outpatient Follow-up Note  Perico Corona 65 y o  female MRN: [id number]  Encounter: [id number]          ASSESSMENT AND PLAN:    1  History of colon polyps, sessile serrated adenomas   - she recently had a screening colonoscopy in a 2 cm sessile polyp was found in the transverse colon was removed in a piecemeal fashion, the site was tattooed in follow-up was recommended 6 months for repeat colonoscopy as this was a sessile serrated adenoma on pathology, she also had 3 other polyps removed  -She will be due for repeat colonoscopy in September,  discussed the risks and benefits with her and she is agreeable, will schedule this for her    -  Follow-up after the procedure to see how she is doing  2  Constipation   -  She states this is currently controlled with her diet, she has not been interested in medications  -  She has not yet obtained a TSH to assess her thyroid function  Will reprint this order for her today    -She has been using Colace as needed but notes that this has not been working very well for her    yll Scot to continue the colace, recommend Miralax 17g daily as needed for constipation  3  Morbid obesity    -  She was referred to Medical weight loss but has not yet scheduled an appointment    -She is currently dieting on her own and feels this is going well   ______________________________________________________________________    SUBJECTIVE:    56-year-old female presents to the office for follow-up after recent colonoscopy  A 2 cm sessile polyp was found in the transverse colon and was removed in a piecemeal fashion, the site was tattooed and follow-up was recommended in 6 months for repeat colonoscopy at this was a sessile serrated adenoma on pathology  She also had 3 other polyps removed          She reports that her constipation has not been under good control recently, she has been taking Colace as needed for this but recently has been straining for bowel movements and in fact to her back out while straining  She denies any hematochezia, melena, unintended weight loss, change in appetite, nausea, vomiting, heartburn, difficulty swallowing or jaundice  She denies any family history of colon cancer  This was her 1st colonoscopy  REVIEW OF SYSTEMS IS OTHERWISE NEGATIVE  Historical Information   Past Medical History:   Diagnosis Date    Acute atopic conjunctivitis of both eyes     Chronic constipation     Lumbago     Morbid obesity with BMI of 40 0-44 9, adult (HCC)     Piriformis syndrome of right side      Past Surgical History:   Procedure Laterality Date    INCISION TENDON SHEATH HAND      AK COLONOSCOPY FLX DX W/COLLJ SPEC WHEN PFRMD N/A 3/8/2018    Procedure: COLONOSCOPY with polypectomy and tattoo;  Surgeon: Brooks Motley DO;  Location: [facility]; Service: Gastroenterology     Social History   History   Alcohol Use    Yes     Comment: Occasional     History   Drug Use No     History   Smoking Status    Former Smoker   Smokeless Tobacco    Never Used     Family History   Problem Relation Age of Onset    Colon cancer Mother     Diabetes Mother     Fibromyalgia Mother     Liver disease Mother     Substance Abuse Mother     Mental illness Mother     Heart disease Father     Colon cancer Father     Liver disease Father     Substance Abuse Father     Mental illness Father     Diabetes Brother        Meds/Allergies       Current Outpatient Prescriptions:     Docusate Sodium 100 MG/5ML ENEM    meloxicam (MOBIC) 7 5 mg tablet    Allergies   Allergen Reactions    Ibuprofen Hives    Naproxen Hives           Objective     There were no vitals taken for this visit  There is no height or weight on file to calculate BMI        PHYSICAL EXAM:      General Appearance:   Alert, cooperative, no distress   HEENT:   Normocephalic, atraumatic, anicteric      Neck:  Supple, symmetrical, trachea midline   Lungs:   Clear to auscultation bilaterally; no rales, rhonchi or wheezing; respirations unlabored    Heart[de-identified]   Regular rate and rhythm; no murmur, rub, or gallop  Abdomen:   Soft, non-tender, non-distended; normal bowel sounds; no masses, no organomegaly    Genitalia:   Deferred    Rectal:   Deferred    Extremities:  No cyanosis, clubbing or edema    Pulses:  2+ and symmetric    Skin:  No jaundice, rashes, or lesions    Lymph nodes:  No palpable cervical lymphadenopathy        Lab Results:   No visits with results within 1 Day(s) from this visit  Latest known visit with results is:   Admission on 03/08/2018, Discharged on 03/08/2018   Component Date Value    Case Report 03/08/2018                      Value:Surgical Pathology Report                         Case: [id number]                                   Authorizing Provider:  Jeimy Cardona DO        Collected:           03/08/2018 1124              Ordering Location:     [facility]        Received:            03/08/2018 [address] [place] [address] Endoscopy                                                          Pathologist:           Magdalena Kramer MD                                                          Specimens:   A) - Large Intestine, Transverse Colon, transverse colon polyp                                      B) - Large Intestine, Sigmoid Colon, Sigmoid polyp at 30cm                                          C) - Colon, Rectal polyps x3                                                               Final Diagnosis 03/08/2018                      Value: This result contains rich text formatting which cannot be displayed here   Additional Information 03/08/2018                      Value: This result contains rich text formatting which cannot be displayed here  Kayleigh Smita Gross Description 03/08/2018                      Value: This result contains rich text formatting which cannot be displayed here  Radiology Results:   No results found

## 2018-06-08 ENCOUNTER — TELEPHONE (OUTPATIENT)
Dept: GASTROENTEROLOGY | Facility: CLINIC | Age: 58
End: 2018-06-08

## 2018-07-23 NOTE — TELEPHONE ENCOUNTER
Pt called because of a letter she received to schedule her colonoscopy in September but she is scheduled for August  Pt wants to know if this is ok with the doctor

## 2018-08-20 ENCOUNTER — ANESTHESIA EVENT (OUTPATIENT)
Dept: GASTROENTEROLOGY | Facility: HOSPITAL | Age: 58
End: 2018-08-20
Payer: COMMERCIAL

## 2018-08-21 ENCOUNTER — ANESTHESIA (OUTPATIENT)
Dept: GASTROENTEROLOGY | Facility: HOSPITAL | Age: 58
End: 2018-08-21
Payer: COMMERCIAL

## 2018-08-21 ENCOUNTER — HOSPITAL ENCOUNTER (OUTPATIENT)
Facility: HOSPITAL | Age: 58
Setting detail: OUTPATIENT SURGERY
Discharge: HOME | End: 2018-08-21
Attending: INTERNAL MEDICINE | Admitting: INTERNAL MEDICINE
Payer: COMMERCIAL

## 2018-08-21 VITALS
HEIGHT: 64 IN | TEMPERATURE: 97 F | HEART RATE: 72 BPM | WEIGHT: 228 LBS | DIASTOLIC BLOOD PRESSURE: 82 MMHG | OXYGEN SATURATION: 96 % | SYSTOLIC BLOOD PRESSURE: 121 MMHG | RESPIRATION RATE: 18 BRPM | BODY MASS INDEX: 38.93 KG/M2

## 2018-08-21 DIAGNOSIS — D12.2 ADENOMA OF ASCENDING COLON: ICD-10-CM

## 2018-08-21 PROCEDURE — 88305 TISSUE EXAM BY PATHOLOGIST: CPT | Performed by: PATHOLOGY

## 2018-08-21 PROCEDURE — 45385 COLONOSCOPY W/LESION REMOVAL: CPT | Performed by: INTERNAL MEDICINE

## 2018-08-21 RX ORDER — PROPOFOL 10 MG/ML
INJECTION, EMULSION INTRAVENOUS AS NEEDED
Status: DISCONTINUED | OUTPATIENT
Start: 2018-08-21 | End: 2018-08-21 | Stop reason: SURG

## 2018-08-21 RX ORDER — PROPOFOL 10 MG/ML
INJECTION, EMULSION INTRAVENOUS AS NEEDED
Status: DISCONTINUED | OUTPATIENT
Start: 2018-08-21 | End: 2018-08-21

## 2018-08-21 RX ORDER — SODIUM CHLORIDE 9 MG/ML
INJECTION, SOLUTION INTRAVENOUS CONTINUOUS PRN
Status: DISCONTINUED | OUTPATIENT
Start: 2018-08-21 | End: 2018-08-21 | Stop reason: SURG

## 2018-08-21 RX ORDER — SODIUM CHLORIDE 9 MG/ML
125 INJECTION, SOLUTION INTRAVENOUS CONTINUOUS
Status: DISCONTINUED | OUTPATIENT
Start: 2018-08-21 | End: 2018-08-21 | Stop reason: HOSPADM

## 2018-08-21 RX ADMIN — PROPOFOL 50 MG: 10 INJECTION, EMULSION INTRAVENOUS at 08:05

## 2018-08-21 RX ADMIN — PROPOFOL 25 MG: 10 INJECTION, EMULSION INTRAVENOUS at 08:12

## 2018-08-21 RX ADMIN — PROPOFOL 100 MG: 10 INJECTION, EMULSION INTRAVENOUS at 08:25

## 2018-08-21 RX ADMIN — PROPOFOL 100 MG: 10 INJECTION, EMULSION INTRAVENOUS at 08:30

## 2018-08-21 RX ADMIN — PROPOFOL 100 MG: 10 INJECTION, EMULSION INTRAVENOUS at 08:15

## 2018-08-21 RX ADMIN — PROPOFOL 75 MG: 10 INJECTION, EMULSION INTRAVENOUS at 08:20

## 2018-08-21 RX ADMIN — PROPOFOL 150 MG: 10 INJECTION, EMULSION INTRAVENOUS at 08:01

## 2018-08-21 RX ADMIN — PROPOFOL 50 MG: 10 INJECTION, EMULSION INTRAVENOUS at 08:35

## 2018-08-21 RX ADMIN — SODIUM CHLORIDE: 0.9 INJECTION, SOLUTION INTRAVENOUS at 07:49

## 2018-08-21 NOTE — ANESTHESIA PREPROCEDURE EVALUATION
Review of Systems/Medical History  Patient summary reviewed  Chart reviewed  No history of anesthetic complications     Cardiovascular  Exercise tolerance (METS): >4,     Pulmonary  Smoker ex-smoker  , No recent URI ,        GI/Hepatic    GERD (diet-induced) well controlled, Bowel prep  Comment: Adenoma of ascending colon     Negative  ROS        Endo/Other    Obesity    GYN  Negative gynecology ROS          Hematology  Negative hematology ROS      Musculoskeletal    Comment: Right piriformis syndrome      Neurology  Negative neurology ROS      Psychology   Negative psychology ROS              Physical Exam    Airway    Mallampati score: II  TM Distance: >3 FB  Neck ROM: full     Dental   No notable dental hx     Cardiovascular  Rhythm: regular, Rate: abnormal,     Pulmonary  Pulmonary exam normal Breath sounds clear to auscultation,     Other Findings        Anesthesia Plan  ASA Score- 2     Anesthesia Type- IV sedation with anesthesia with ASA Monitors  Additional Monitors:   Airway Plan:     Comment: I discussed the risks and benefits of IV sedation anesthesia including the possibility of the need to convert to general anesthesia and the potential risk of awareness  The patient was given the opportunity to ask questions, which were answered        Plan Factors-    Induction- intravenous  Postoperative Plan-     Informed Consent- Anesthetic plan and risks discussed with patient

## 2018-08-21 NOTE — H&P
History and Physical - [facility]  Kesha Bishop 63 y o  female MRN: [id number]      HPI: Kesha Bishop is a 63y o  year old female who presents for colonoscopy follow up due to multiple polyps found recently, path was SSA and villous architecture  REVIEW OF SYSTEMS: Per the HPI, and otherwise unremarkable  Historical Information   Past Medical History:   Diagnosis Date    Acute atopic conjunctivitis of both eyes     Chronic constipation     History of colon polyps     Lumbago     Morbid obesity with BMI of 40 0-44 9, adult (HCC)     Piriformis syndrome of right side      Past Surgical History:   Procedure Laterality Date    COLONOSCOPY      INCISION TENDON SHEATH HAND      OF A FINGER; LAST ASSESSED: 10/4/17    DE COLONOSCOPY FLX DX W/COLLJ SPEC WHEN PFRMD N/A 3/8/2018    Procedure: COLONOSCOPY with polypectomy and tattoo;  Surgeon: Rosa Bonilla DO;  Location: [facility];   Service: Gastroenterology     Social History   History   Alcohol Use    Yes     Comment: Occasional     History   Drug Use No     History   Smoking Status    Former Smoker   Smokeless Tobacco    Never Used     Family History   Problem Relation Age of Onset    Colon cancer Mother     Diabetes Mother         MELLITUS    Fibromyalgia Mother     Liver disease Mother     Substance Abuse Mother     Mental illness Mother     Heart disease Father         CARDIAC DISORDER    Colon cancer Father     Liver disease Father     Substance Abuse Father     Mental illness Father     Heart failure Father     Diabetes Brother         MELLITUS       Meds/Allergies     Prescriptions Prior to Admission   Medication    docusate sodium (COLACE) 100 mg capsule    meloxicam (MOBIC) 7 5 mg tablet    Na Sulfate-K Sulfate-Mg Sulf (SUPREP BOWEL PREP KIT) 17 5-3 13-1 6 GM/180ML SOLN    polyethylene glycol (GLYCOLAX) powder       Allergies   Allergen Reactions    Ibuprofen Hives    Naproxen Hives       Objective Blood pressure 147/55, pulse 71, temperature (!) 97 °F (36 1 °C), resp  rate 16, height 5' 4" (1 626 m), weight 103 kg (228 lb), SpO2 97 %  PHYSICAL EXAM    Gen: NAD  CV: RRR  CHEST: Clear  ABD: soft, NT/ND  EXT: no edema      ASSESSMENT/PLAN:  This is a 63y o  year old female here for colonoscopy, and she is stable and optimized for her procedure

## 2018-08-21 NOTE — OP NOTE
**** GI/ENDOSCOPY REPORT ****     PATIENT NAME: León Turner ------ VISIT ID:  Patient ID: [id number]   YOB: 1960     INTRODUCTION: Colonoscopy - A 63 female patient presents for an outpatient   Colonoscopy at [address] [place] [address] COLONOSCOPY: This colonoscopy is being performed for diagnostic   indication     INDICATIONS: Surveillance  Screening for personal history of polyps  CONSENT:  The benefits, risks, and alternatives to the procedure were   discussed and informed consent was obtained from the patient  PREPARATION: EKG, pulse, pulse oximetry and blood pressure were monitored   throughout the procedure  The patient was identified by myself both   verbally and by visual inspection of ID band  Airway Assessment   Classification: Airway class 2 - Visualization of the soft palate, fauces   and uvula  ASA Classification: Class 2 - Patient has mild to moderate   systemic disturbance that may or may not be related to the disorder   requiring surgery  MEDICATIONS: Anesthesia-check records     PROCEDURE:  The endoscope was passed without difficulty through the anus   under direct visualization and advanced to the cecum, confirmed by   appendiceal orifice and ileocecal valve  The scope was withdrawn and the   mucosa was carefully examined  The quality of the preparation was good  Cecal Intubation Time: 11 minutes(s) Scope Withdrawal Time: 33 minutes(s)     RECTAL EXAM: Normal rectal exam      FINDINGS:   A single sessile polyp, measuring 6 mm in size, was found in   the sigmoid colon  The polyp was removed by cold snare polypectomy  A   single sessile polyp, measuring 8 mm in size, was found in the sigmoid   colon adjacent to a diverticuli  The polyp was removed by cold snare   polypectomy  There was some oozing of blood at the polypectomy site and so   one clip was applied    At the prior tattoo site two small areas of 5 mm in   size of residual polyp tissue was found  They were removed with cold   biopsy forceps  There was evidence of moderate diverticulosis in the   sigmoid colon  COMPLICATIONS: There were no complications  IMPRESSIONS: A single sessile polyp found in the sigmoid colon; removed by   cold snare polypectomy  A single sessile polyp found in the sigmoid colon;   removed by cold snare polypectomy  Site clipped  Two small polyps at the   prior polypectomy site  Removed with cold biopsy forceps  Diverticulosis   found in the sigmoid colon  RECOMMENDATIONS: Await polypectomy results  Repeat colonoscopy based on   pathology but likely in 3 years  ESTIMATED BLOOD LOSS:     PATHOLOGY SPECIMENS: Removed by cold snare polypectomy  Removed by cold   snare polypectomy  Cold forceps random biopsy taken from the mid-sigmoid   colon  PROCEDURE CODES:     ICD-9 Codes: V12 72 Personal history of colonic polyps 211 3 Benign   neoplasm of colon 211 3 Benign neoplasm of colon 562 10 Diverticulosis of   colon (without mention of hemorrhage)     ICD-10 Codes: Z86 010 Personal history of colonic polyps K63 5 Polyp of   colon K63 5 Polyp of colon K63 5 Polyp of colon K57 Diverticular disease   of intestine     PERFORMED BY: BELINDA Wells  on 08/21/2018  Version 1, electronically signed by BELINDA Jonas  on   08/21/2018 at 08:56

## 2018-08-21 NOTE — ANESTHESIA POSTPROCEDURE EVALUATION
Post-Op Assessment Note      CV Status:  Stable    Mental Status:  Awake    Hydration Status:  Stable    PONV Controlled:  None    Airway Patency:  Patent    Post Op Vitals Reviewed: Yes          Staff: Anesthesiologist, CRNA           BP      Temp     Pulse     Resp      SpO2

## 2019-11-11 ENCOUNTER — OFFICE VISIT (OUTPATIENT)
Dept: FAMILY MEDICINE CLINIC | Facility: CLINIC | Age: 59
End: 2019-11-11
Payer: COMMERCIAL

## 2019-11-11 VITALS
WEIGHT: 240 LBS | DIASTOLIC BLOOD PRESSURE: 78 MMHG | BODY MASS INDEX: 41.2 KG/M2 | SYSTOLIC BLOOD PRESSURE: 118 MMHG | HEART RATE: 76 BPM | TEMPERATURE: 97.5 F | OXYGEN SATURATION: 97 %

## 2019-11-11 DIAGNOSIS — Z23 NEED FOR VACCINATION: ICD-10-CM

## 2019-11-11 DIAGNOSIS — Z12.39 SCREENING FOR BREAST CANCER: ICD-10-CM

## 2019-11-11 DIAGNOSIS — H69.81 EUSTACHIAN TUBE DYSFUNCTION, RIGHT: Primary | ICD-10-CM

## 2019-11-11 PROBLEM — Z12.11 SPECIAL SCREENING FOR MALIGNANT NEOPLASMS, COLON: Status: RESOLVED | Noted: 2018-01-30 | Resolved: 2019-11-11

## 2019-11-11 PROCEDURE — 90471 IMMUNIZATION ADMIN: CPT | Performed by: PHYSICIAN ASSISTANT

## 2019-11-11 PROCEDURE — 99213 OFFICE O/P EST LOW 20 MIN: CPT | Performed by: PHYSICIAN ASSISTANT

## 2019-11-11 PROCEDURE — 90686 IIV4 VACC NO PRSV 0.5 ML IM: CPT | Performed by: PHYSICIAN ASSISTANT

## 2019-11-11 RX ORDER — FLUTICASONE PROPIONATE 50 MCG
2 SPRAY, SUSPENSION (ML) NASAL DAILY
Qty: 1 BOTTLE | Refills: 1 | Status: SHIPPED | OUTPATIENT
Start: 2019-11-11

## 2019-11-12 NOTE — PROGRESS NOTES
Assessment/Plan:    1  Eustachian tube dysfunction, right  Discussed her symptoms are likely secondary to nasal congestion  No cerumen in ear canals  Recommended Flonase daily  Also recommended over-the-counter antihistamine such as Claritin or Zyrtec  Avoid irritants  Return to care if symptoms worsen or fail to improve  - fluticasone (FLONASE) 50 mcg/act nasal spray; 2 sprays into each nostril daily  Dispense: 1 Bottle; Refill: 1    2  Need for vaccination  Discussed risks and benefits of influenza vaccine  Patient received today  Patient tolerated well  - influenza vaccine, [id number], quadrivalent, 0 5 mL, preservative-free, for adult and pediatric patients 6 mos+ (AFLURIA, FLUARIX, FLULAVAL, FLUZONE)    3  Screening for breast cancer  Due for screening mammogram   - Mammo screening bilateral w cad; Future    Patient Active Problem List   Diagnosis    Adenoma of ascending colon     Subjective:      Patient ID: Aurelia Roe is a 63 y o  female  Patient is here complaining of her right clogged ear  She states it started 2 days ago  States it is difficult hear out of this ear  Denies tinnitus in otorrhea  Denies involvement of the left ear  States this happened a couple years ago and she had to have her ear flushed  She has been using Debrox to no relief  Denies fever  Denies ear pain  Admits to congestion and postnasal drip  Denies sinus pressure and pain  Denies headaches  Denies sore throat  Denies cough  Denies wheezing and shortness of breath  Denies nausea and vomiting  Denies abdominal pain  She has not tried anything else  Denies ill contacts  Denies travel history  The following portions of the patient's history were reviewed and updated as appropriate: allergies, current medications, past family history, past medical history, past social history, past surgical history and problem list     Review of Systems   Constitutional: Negative for chills, fatigue and fever     HENT: Positive for congestion, postnasal drip and rhinorrhea  Negative for ear discharge, ear pain, sinus pressure, sinus pain and sore throat  Respiratory: Negative for cough, chest tightness, shortness of breath and wheezing  Cardiovascular: Negative for chest pain and palpitations  Gastrointestinal: Negative for abdominal pain, constipation, diarrhea, nausea and vomiting  Musculoskeletal: Negative for arthralgias and myalgias  Skin: Negative for rash  Allergic/Immunologic: Negative for environmental allergies  Neurological: Negative for dizziness, light-headedness and headaches  Hematological: Negative for adenopathy  Objective:      /78 (BP Location: Left arm, Patient Position: Sitting, Cuff Size: Standard)   Pulse 76   Temp 97 5 °F (36 4 °C) (Tympanic)   Wt 109 kg (240 lb)   SpO2 97%   BMI 41 20 kg/m²          Physical Exam   Constitutional: She is oriented to person, place, and time  She appears well-developed and well-nourished  HENT:   Head: Normocephalic and atraumatic  Right Ear: Hearing, tympanic membrane, external ear and ear canal normal    Left Ear: Hearing, tympanic membrane, external ear and ear canal normal    Nose: Mucosal edema and rhinorrhea present  Mouth/Throat: Uvula is midline, oropharynx is clear and moist and mucous membranes are normal  Tonsils are 0 on the right  Tonsils are 0 on the left  No tonsillar exudate  Eyes: Pupils are equal, round, and reactive to light  Conjunctivae are normal    Neck: Normal range of motion  Neck supple  Cardiovascular: Normal rate, regular rhythm, S1 normal, S2 normal, normal heart sounds and intact distal pulses  Pulmonary/Chest: Effort normal and breath sounds normal    Abdominal: Soft  Normal appearance and bowel sounds are normal  She exhibits no mass  There is no hepatosplenomegaly  There is no tenderness  Musculoskeletal: Normal range of motion  Lymphadenopathy:     She has no cervical adenopathy  Neurological: She is alert and oriented to person, place, and time  Skin: Skin is warm, dry and intact  No rash noted  Psychiatric: She has a normal mood and affect  Her behavior is normal  Judgment and thought content normal    Nursing note and vitals reviewed  Current Outpatient Medications on File Prior to Visit   Medication Sig Dispense Refill    docusate sodium (COLACE) 100 mg capsule Take 1 capsule (100 mg total) by mouth daily 90 capsule 2    polyethylene glycol (GLYCOLAX) powder Take 17 g by mouth daily 289 g 0    meloxicam (MOBIC) 7 5 mg tablet Take 7 5 mg by mouth      Na Sulfate-K Sulfate-Mg Sulf (SUPREP BOWEL PREP KIT) 17 5-3 13-1 6 GM/180ML SOLN Take 1 kit by mouth once for 1 dose 1 Bottle 0     No current facility-administered medications on file prior to visit

## 2019-11-17 DIAGNOSIS — K59.04 CHRONIC IDIOPATHIC CONSTIPATION: ICD-10-CM

## 2019-11-19 RX ORDER — DOCUSATE SODIUM 100 MG/1
CAPSULE, LIQUID FILLED ORAL
Qty: 90 CAPSULE | Refills: 0 | Status: SHIPPED | OUTPATIENT
Start: 2019-11-19

## 2019-11-19 NOTE — TELEPHONE ENCOUNTER
Please call her, patient has not been seen in over a year and is requesting refills  The medication is available over-the-counter so she would prefer to purchase it over-the-counter or get it from her PCP that is okay  Would also be happy to see her in the office

## 2019-11-19 NOTE — TELEPHONE ENCOUNTER
Pt is aware she can get this over the counter  She does state she will call back to schedule a follow up but did not wish to schedule today

## 2021-03-03 ENCOUNTER — TELEPHONE (OUTPATIENT)
Dept: FAMILY MEDICINE CLINIC | Facility: CLINIC | Age: 61
End: 2021-03-03

## (undated) DEVICE — RADIAL JAW 4 JUMBO W/ NEEDLE 240CM

## (undated) DEVICE — SINGLE-USE BIOPSY FORCEPS: Brand: RADIAL JAW 4

## (undated) DEVICE — THE LARIAT SNARE IS AN ELECTROSURGICAL DEVICE USED TO ENDOSCOPICALLY GRASP, DISSECT, AND TRANSECT TISSUE DURING GASTROINTESTINAL (GI) ENDOSCOPIC PROCEDURES.  THE SNARE CAN BE USED WITH OR WITHOUT THE USE OF MONOPOLAR DIATHERMIC ENERGY.: Brand: LARIAT

## (undated) DEVICE — THE EXACTO COLD SNARE IS INTENDED TO BE USED WITHOUT DIATHERMIC ENERGY FOR THE ENDOSCOPIC RESECTION OF POLYP TISSUE IN THE GASTROINTESTINAL TRACT.: Brand: EXACTO

## (undated) DEVICE — MARKER SPOT EX  BOWEL TATTOO SYRINGE

## (undated) DEVICE — NEEDLE SCLERO INTERJECT 25G 240MM

## (undated) DEVICE — WORKING LENGTH 235CM, WORKING CHANNEL 2.8MM: Brand: RESOLUTION 360 CLIP